# Patient Record
Sex: MALE | Race: WHITE | Employment: OTHER | URBAN - METROPOLITAN AREA
[De-identification: names, ages, dates, MRNs, and addresses within clinical notes are randomized per-mention and may not be internally consistent; named-entity substitution may affect disease eponyms.]

---

## 2017-01-03 ENCOUNTER — TELEPHONE (OUTPATIENT)
Dept: FAMILY MEDICINE | Facility: CLINIC | Age: 42
End: 2017-01-03

## 2017-01-03 NOTE — TELEPHONE ENCOUNTER
Plan does not cover one touch verio test strip as ordered.   Insurance wants qty 200 for 25 days.   Please call to initiate prior authorization.     Phone: [phone]  ID: [id number]

## 2017-01-04 NOTE — TELEPHONE ENCOUNTER
PA approved for One Touch Verio Strip    Approved 1/4/17-1/4/18    Called and informed pharmacy    Carolina Ward MA/MARIELLA

## 2017-01-06 ENCOUNTER — TELEPHONE (OUTPATIENT)
Dept: FAMILY MEDICINE | Facility: CLINIC | Age: 42
End: 2017-01-06

## 2017-01-06 DIAGNOSIS — E10.9 TYPE 1 DIABETES MELLITUS WITHOUT COMPLICATION (H): Primary | ICD-10-CM

## 2017-01-09 NOTE — TELEPHONE ENCOUNTER
Called to check the status of PA-it is still in process. Will received a faxed response.Carolina VALERO      Called and informed patient. He wants to make sure the insurance knows that he has trouble with the pens systems for insulin. He staes his skin is tougher and that the syringe/vial systems works for him. Called and infored insurance.    Also, patient does not want to try Tresiba. He has read up on it and does not want this.Carolina VALERO

## 2017-01-11 NOTE — TELEPHONE ENCOUNTER
PA was denied for Lantus-Formulary alternatives have not been tried    Patient sated the other day he does not want to try Tresiba-is there any other alternatives he can try?    Carolina Ward MA/TC

## 2017-01-11 NOTE — TELEPHONE ENCOUNTER
tresiba is actually better then lantus. Not sure why patient doesn't want to try. Can see our dm ed if desired.

## 2017-01-11 NOTE — TELEPHONE ENCOUNTER
Called and spoke to patient. He said he he has read up on the tresiba and would rather try Basaglar. He said this is more like Lantus. He said he will not be taking any long term insulin until this is figured out.Carolina Ward MA/MARIELLA

## 2017-01-12 RX ORDER — INSULIN GLARGINE 100 [IU]/ML
15 INJECTION, SOLUTION SUBCUTANEOUS 2 TIMES DAILY
Qty: 3 PEN | Refills: 1 | Status: SHIPPED | OUTPATIENT
Start: 2017-01-12 | End: 2017-02-06

## 2017-01-16 ENCOUNTER — TELEPHONE (OUTPATIENT)
Dept: FAMILY MEDICINE | Facility: CLINIC | Age: 42
End: 2017-01-16

## 2017-01-16 DIAGNOSIS — E10.9 TYPE 1 DIABETES MELLITUS WITHOUT COMPLICATION (H): Primary | ICD-10-CM

## 2017-01-16 NOTE — TELEPHONE ENCOUNTER
Called pharmacy since patient has refused to use pen needles in the past week or so.  Pharmacy said patient is the one requesting the pen needles to go with the new Basaglar insulin.    RN sent new Rx in for requested pen needles : 31G x 8mm.    Madison Forman RN

## 2017-01-16 NOTE — TELEPHONE ENCOUNTER
Dinorah states patient needs a box of pen needles to go along with his insulin prescription.    Thank you.

## 2017-01-19 ENCOUNTER — TELEPHONE (OUTPATIENT)
Dept: FAMILY MEDICINE | Facility: CLINIC | Age: 42
End: 2017-01-19

## 2017-01-19 NOTE — TELEPHONE ENCOUNTER
Patient would like a call back re his Basaglar insulin and wants documented in his file that he has tried the Insulin pen for several days not working. Unsure how much insulin actually gets in and what is wasted in the process. States insurance will only pay for this product wants a call back from nurse to discuss.

## 2017-01-19 NOTE — TELEPHONE ENCOUNTER
Pt notified of provider message as written.  Pt verbalized good understanding.  Cheyanne Levin RN

## 2017-01-19 NOTE — TELEPHONE ENCOUNTER
Pt states he just want it documented in his chart that he is having difficulty using the new pen needles since Sun evening and they are too big and bulky.  He states it falls out of his leg and has caused internal lacerations.  Pt is unsure how much of the medicine he is getting.  Pt would like to return to vials and syringe.  He does not like the delivery device for the new medication.  Pt asking if PA can be done for him to return to what he was taking previously.      Advised to see diabetic ed who can help determine what he can have per his insurance and help with PA if needed.  Pt declines.    Pt states he knows Dr. Nino wants him to come in for appointment but is wondering if PA for other med can be done now.  He would rather come in for ov next month.    Cheyanne Levin RN

## 2017-02-06 ENCOUNTER — TELEPHONE (OUTPATIENT)
Dept: FAMILY MEDICINE | Facility: CLINIC | Age: 42
End: 2017-02-06

## 2017-02-06 DIAGNOSIS — E10.9 TYPE 1 DIABETES MELLITUS WITHOUT COMPLICATION (H): Primary | ICD-10-CM

## 2017-02-06 RX ORDER — INSULIN GLARGINE 100 [IU]/ML
INJECTION, SOLUTION SUBCUTANEOUS
Qty: 10 ML | Refills: 0 | Status: SHIPPED | OUTPATIENT
Start: 2017-02-06 | End: 2017-04-24

## 2017-02-06 NOTE — TELEPHONE ENCOUNTER
Patient is calling to check the status of the prior authorization for the insulin glargine (BASAGLAR) 100 UNIT/ML injection  Please call and discuss  Thank you

## 2017-02-06 NOTE — TELEPHONE ENCOUNTER
Pt has been having poor blood sugar control with the basaglar due to having difficulty administrating the insulin, it comes flying out.  Pt would prefer Lantus.  Discussed with diabetic educator who gave me information on [employer] card program to give to pt so he can get Lantus for $10 copay.  Pt notified, information mailed to pt and new prescription sent to pharmacy.  To provider to cosign for change to lantus.  Cheyanne Levin RN

## 2017-02-17 ENCOUNTER — TELEPHONE (OUTPATIENT)
Dept: FAMILY MEDICINE | Facility: CLINIC | Age: 42
End: 2017-02-17

## 2017-02-17 NOTE — TELEPHONE ENCOUNTER
See previous message.  Advised pharmacy no PA needed.  Pt was given information on [employer] program and he will be bringing it to them so he can get the lantus.  Cheyanne Levin RN

## 2017-02-17 NOTE — TELEPHONE ENCOUNTER
Dinorah from [facility] is calling for status on RX: Lantus, wondering if PA will be starting on this. Please call to discuss. Thank you.

## 2017-03-20 DIAGNOSIS — E10.9 TYPE 1 DIABETES MELLITUS WITHOUT COMPLICATION (H): ICD-10-CM

## 2017-03-20 NOTE — LETTER
[facility]                                           [address] [place]  [address]    March 20, 2017    Codey Simon  [facility] [address]    Dear Codey,       We recently received a refill request forinsulin lispro (HUMALOG) 100 UNIT/ML injection .  We have refilled this for  one time  only because you are due for a:    Diabetes office visit and  lab appointment-no further refills without an appointment per Dr Nino      Please schedule this lab appointment 4-5 days prior to the office visit.     Please call at your earliest convenience so that there will not be a delay with your future refills.          Thank you,   Your [facility] Care Team/  [phone]

## 2017-03-20 NOTE — TELEPHONE ENCOUNTER
Please advise patient last refill. Need to be seen or new pmd if not seen for md appointment with previsit fasting labs.

## 2017-04-22 DIAGNOSIS — E10.9 TYPE 1 DIABETES MELLITUS WITHOUT COMPLICATION (H): ICD-10-CM

## 2017-04-24 RX ORDER — INSULIN GLARGINE 100 [IU]/ML
INJECTION, SOLUTION SUBCUTANEOUS
Refills: 1 | OUTPATIENT
Start: 2017-04-24

## 2017-04-24 RX ORDER — INSULIN GLARGINE 100 [IU]/ML
INJECTION, SOLUTION SUBCUTANEOUS
Qty: 10 ML | Refills: 0 | Status: SHIPPED | OUTPATIENT
Start: 2017-04-24 | End: 2017-05-02

## 2017-05-01 ENCOUNTER — TELEPHONE (OUTPATIENT)
Dept: FAMILY MEDICINE | Facility: CLINIC | Age: 42
End: 2017-05-01

## 2017-05-01 DIAGNOSIS — E10.9 TYPE 1 DIABETES MELLITUS WITHOUT COMPLICATION (H): ICD-10-CM

## 2017-05-01 NOTE — TELEPHONE ENCOUNTER
Patient is calling no phone number/or wouldn't give a phone number. Is calling to request medication RX: insulin glargine (LANTUS) 100 UNIT/ML injection, patient was very specific and would like the vile not the pen. Also, RX: insulin lispro (HUMALOG) 100 UNIT/ML injection also in vile, RX: test strips has to be Verio brand for one touch per patient and has to be 300, and RX: for insulin. Thank you.

## 2017-05-02 RX ORDER — INSULIN GLARGINE 100 [IU]/ML
INJECTION, SOLUTION SUBCUTANEOUS
Qty: 10 ML | Refills: 0 | Status: SHIPPED | OUTPATIENT
Start: 2017-05-02 | End: 2017-05-16

## 2017-05-06 DIAGNOSIS — E10.9 TYPE 1 DIABETES MELLITUS WITHOUT COMPLICATION (H): ICD-10-CM

## 2017-05-08 RX ORDER — PEN NEEDLE, DIABETIC 29 G X1/2"
NEEDLE, DISPOSABLE MISCELLANEOUS
Qty: 400 EACH | Refills: 0 | Status: SHIPPED | OUTPATIENT
Start: 2017-05-08 | End: 2017-05-16

## 2017-05-15 ENCOUNTER — TELEPHONE (OUTPATIENT)
Dept: FAMILY MEDICINE | Facility: CLINIC | Age: 42
End: 2017-05-15

## 2017-05-15 ENCOUNTER — OFFICE VISIT (OUTPATIENT)
Dept: FAMILY MEDICINE | Facility: CLINIC | Age: 42
End: 2017-05-15
Payer: COMMERCIAL

## 2017-05-15 VITALS
HEIGHT: 68 IN | HEART RATE: 95 BPM | WEIGHT: 155 LBS | BODY MASS INDEX: 23.49 KG/M2 | TEMPERATURE: 97.6 F | OXYGEN SATURATION: 99 % | SYSTOLIC BLOOD PRESSURE: 127 MMHG | DIASTOLIC BLOOD PRESSURE: 81 MMHG

## 2017-05-15 DIAGNOSIS — E10.9 TYPE 1 DIABETES MELLITUS WITHOUT COMPLICATION (H): Primary | ICD-10-CM

## 2017-05-15 DIAGNOSIS — Z13.6 CARDIOVASCULAR SCREENING; LDL GOAL LESS THAN 100: ICD-10-CM

## 2017-05-15 DIAGNOSIS — M25.512 LEFT SHOULDER PAIN, UNSPECIFIED CHRONICITY: ICD-10-CM

## 2017-05-15 DIAGNOSIS — M72.0 DUPUYTREN'S CONTRACTURE: ICD-10-CM

## 2017-05-15 LAB
ANION GAP SERPL CALCULATED.3IONS-SCNC: 10 MMOL/L (ref 3–14)
BUN SERPL-MCNC: 15 MG/DL (ref 7–30)
CALCIUM SERPL-MCNC: 9.3 MG/DL (ref 8.5–10.1)
CHLORIDE SERPL-SCNC: 105 MMOL/L (ref 94–109)
CHOLEST SERPL-MCNC: 153 MG/DL
CO2 SERPL-SCNC: 25 MMOL/L (ref 20–32)
CREAT SERPL-MCNC: 0.83 MG/DL (ref 0.66–1.25)
CREAT UR-MCNC: 155 MG/DL
GFR SERPL CREATININE-BSD FRML MDRD: ABNORMAL ML/MIN/1.7M2
GLUCOSE SERPL-MCNC: 154 MG/DL (ref 70–99)
HBA1C MFR BLD: 8 % (ref 4.3–6)
HDLC SERPL-MCNC: 48 MG/DL
LDLC SERPL CALC-MCNC: 92 MG/DL
MICROALBUMIN UR-MCNC: 9 MG/L
MICROALBUMIN/CREAT UR: 5.63 MG/G CR (ref 0–17)
NONHDLC SERPL-MCNC: 105 MG/DL
POTASSIUM SERPL-SCNC: 3.7 MMOL/L (ref 3.4–5.3)
SODIUM SERPL-SCNC: 140 MMOL/L (ref 133–144)
T4 FREE SERPL-MCNC: 1.1 NG/DL (ref 0.76–1.46)
TRIGL SERPL-MCNC: 65 MG/DL
TSH SERPL DL<=0.005 MIU/L-ACNC: 0.37 MU/L (ref 0.4–4)

## 2017-05-15 PROCEDURE — 82043 UR ALBUMIN QUANTITATIVE: CPT | Performed by: FAMILY MEDICINE

## 2017-05-15 PROCEDURE — 80061 LIPID PANEL: CPT | Performed by: FAMILY MEDICINE

## 2017-05-15 PROCEDURE — 80048 BASIC METABOLIC PNL TOTAL CA: CPT | Performed by: FAMILY MEDICINE

## 2017-05-15 PROCEDURE — 84443 ASSAY THYROID STIM HORMONE: CPT | Performed by: FAMILY MEDICINE

## 2017-05-15 PROCEDURE — 84439 ASSAY OF FREE THYROXINE: CPT | Performed by: FAMILY MEDICINE

## 2017-05-15 PROCEDURE — 99214 OFFICE O/P EST MOD 30 MIN: CPT | Performed by: FAMILY MEDICINE

## 2017-05-15 PROCEDURE — 36415 COLL VENOUS BLD VENIPUNCTURE: CPT | Performed by: FAMILY MEDICINE

## 2017-05-15 PROCEDURE — 83036 HEMOGLOBIN GLYCOSYLATED A1C: CPT | Performed by: FAMILY MEDICINE

## 2017-05-15 NOTE — NURSING NOTE
"Chief Complaint   Patient presents with     Diabetes     Musculoskeletal Problem     right hand     Shoulder left       Initial /81  Pulse 95  Temp 97.6  F (36.4  C) (Oral)  Ht 5' 8\" (1.727 m)  Wt 155 lb (70.3 kg)  SpO2 99%  BMI 23.57 kg/m2 Estimated body mass index is 23.57 kg/(m^2) as calculated from the following:    Height as of this encounter: 5' 8\" (1.727 m).    Weight as of this encounter: 155 lb (70.3 kg).  Medication Reconciliation: complete   Cheyanne Pitts CMA    "

## 2017-05-15 NOTE — LETTER
[facility]  [address] [facility] [address] [phone]        May 16, 2017    Codey Simon  [address] [facility] [address]            Dear Codey,    Generally normal results except blood sugars too high and tsh (thyroid test) slightly low. Recheck in 6 months. Normal cholesterol and kidney tests.    If you have any questions or concerns, please call myself or my nurse at [phone].    Sincerely,    .Nadeem Nino MD/liseth      Results for orders placed or performed in visit on 05/15/17   Hemoglobin A1c   Result Value Ref Range    Hemoglobin A1C 8.0 (H) 4.3 - 6.0 %   TSH with free T4 reflex   Result Value Ref Range    TSH 0.37 (L) 0.40 - 4.00 mU/L   Lipid panel reflex to direct LDL   Result Value Ref Range    Cholesterol 153 <200 mg/dL    Triglycerides 65 <150 mg/dL    HDL Cholesterol 48 >39 mg/dL    LDL Cholesterol Calculated 92 <100 mg/dL    Non HDL Cholesterol 105 <130 mg/dL   Basic metabolic panel   Result Value Ref Range    Sodium 140 133 - 144 mmol/L    Potassium 3.7 3.4 - 5.3 mmol/L    Chloride 105 94 - 109 mmol/L    Carbon Dioxide 25 20 - 32 mmol/L    Anion Gap 10 3 - 14 mmol/L    Glucose 154 (H) 70 - 99 mg/dL    Urea Nitrogen 15 7 - 30 mg/dL    Creatinine 0.83 0.66 - 1.25 mg/dL    GFR Estimate >90  Non  GFR Calc   >60 mL/min/1.7m2    GFR Estimate If Black >90   GFR Calc   >60 mL/min/1.7m2    Calcium 9.3 8.5 - 10.1 mg/dL   Albumin Random Urine Quantitative   Result Value Ref Range    Creatinine Urine 155 mg/dL    Albumin Urine mg/L 9 mg/L    Albumin Urine mg/g Cr 5.63 0 - 17 mg/g Cr   T4 free   Result Value Ref Range    T4 Free 1.10 0.76 - 1.46 ng/dL

## 2017-05-15 NOTE — MR AVS SNAPSHOT
After Visit Summary   5/15/2017    Codey Simon    MRN: [id number]           Patient Information     Date Of Birth          1975        Visit Information        Provider Department      5/15/2017 1:30 PM Nadeem Nino MD [facility]        Today's Diagnoses     Type 1 diabetes mellitus without complication (H)    -  1    CARDIOVASCULAR SCREENING; LDL GOAL LESS THAN 100        Left shoulder pain, unspecified chronicity        Dupuytren's contracture           Follow-ups after your visit        Additional Services     ORTHOPEDICS ADULT REFERRAL       Your provider has referred you to: FM[facility]: [facility] [phone]   [email or web address]  [facility]: [facility] [phone]   [email or web address]    Please be aware that coverage of these services is subject to the terms and limitations of your health insurance plan.  Call member services at your health plan with any benefit or coverage questions.      Please bring the following to your appointment:    >>   Any x-rays, CTs or MRIs which have been performed.  Contact the facility where they were done to arrange for  prior to your scheduled appointment.    >>   List of current medications   >>   This referral request   >>   Any documents/labs given to you for this referral                  Who to contact     If you have questions or need follow up information about today's clinic visit or your schedule please contact [facility] directly at [phone].  Normal or non-critical lab and imaging results will be communicated to you by MyChart, letter or phone within 4 business days after the clinic has received the results. If you do not hear from us within 7 days, please contact the clinic through MyChart or phone. If you have a critical or abnormal lab result, we will notify you by phone as soon as  "possible.  Submit refill requests through [employer] or call your pharmacy and they will forward the refill request to us. Please allow 3 business days for your refill to be completed.          Additional Information About Your Visit        [employer] Information     [employer] lets you send messages to your doctor, view your test results, renew your prescriptions, schedule appointments and more. To sign up, go to [email or web address] . Click on \"Log in\" on the left side of the screen, which will take you to the Welcome page. Then click on \"Sign up Now\" on the right side of the page.     You will be asked to enter the access code listed below, as well as some personal information. Please follow the directions to create your username and password.     Your access code is: [email or web address]  Expires: 2017  2:06 PM     Your access code will  in 90 days. If you need help or a new code, please call your [place] clinic or [phone].        Care EveryWhere ID     This is your Care EveryWhere ID. This could be used by other organizations to access your [place] medical records  [id number]        Your Vitals Were     Pulse Temperature Height Pulse Oximetry BMI (Body Mass Index)       95 97.6  F (36.4  C) (Oral) 5' 8\" (1.727 m) 99% 23.57 kg/m2        Blood Pressure from Last 3 Encounters:   05/15/17 127/81   16 123/70   02/23/15 134/87    Weight from Last 3 Encounters:   05/15/17 155 lb (70.3 kg)   16 160 lb (72.6 kg)   02/23/15 156 lb (70.8 kg)              We Performed the Following     Albumin Random Urine Quantitative     Basic metabolic panel     Hemoglobin A1c     Lipid panel reflex to direct LDL     ORTHOPEDICS ADULT REFERRAL     TSH with free T4 reflex          Today's Medication Changes          These changes are accurate as of: 5/15/17  2:06 PM.  If you have any questions, ask your nurse or doctor.               These medicines have changed or have updated prescriptions.        Dose/Directions    " "insulin lispro 100 UNIT/ML injection   Commonly known as:  HumaLOG   This may have changed:  additional instructions   Used for:  Type 1 diabetes mellitus without complication (H)   Changed by:  Nadeem Nino MD        Inject  Subcutaneous. 65u daily-sliding scale 3 month supply please.   Quantity:  3 vial   Refills:  0            Where to get your medicines      These medications were sent to [facility] [address] [facility] [place] - [address]  [address] [address]     Phone:  [phone]     insulin lispro 100 UNIT/ML injection                Primary Care Provider Office Phone # Fax #    Nadeem Nino [phone]       [facility] [address] [facility] [address]        Thank you!     Thank you for choosing [facility]  for your care. Our goal is always to provide you with excellent care. Hearing back from our patients is one way we can continue to improve our services. Please take a few minutes to complete the written survey that you may receive in the mail after your visit with us. Thank you!             Your Updated Medication List - Protect others around you: Learn how to safely use, store and throw away your medicines at www.disposemymeds.org.          This list is accurate as of: 5/15/17  2:06 PM.  Always use your most recent med list.                   Brand Name Dispense Instructions for use    alcohol swab prep pads     300 each    Use to swab area of injection/lukas as directed       B-D ULTRA-FINE II SHORT NEEDLE MISC     3 Month    Inject 1 each into the skin 4 times daily (with meals and nightly). 1 box1 box       BD insulin syringe ultrafine 31G X 5/16\" 0.3 ML   Generic drug:  insulin syringe-needle U-100     400 each    USE ONE SYRINGE FOUR TIMES DAILY OR AS DIRECETED       blood glucose calibration solution    NO BRAND SPECIFIED    1 Bottle    Use to calibrate blood glucose monitor as needed as directed.  Dispense One Touch " Verio IQ brand or per insurance.       * blood glucose monitoring test strip    no brand specified    3 Box    by In Vitro route 8 times daily. Per insurance plan - 3 month supply with one year refill       * blood glucose monitoring test strip    no brand specified    300 each    by In Vitro route daily Use to test blood sugar 10 times daily or as directed. Needs to be ONETOUCH ULTRA TEST STRIPS       * blood glucose monitoring test strip    no brand specified    300 strip    Use to test blood sugar 10 times daily or as directed.  Dispense One Touch Verio IQ brand or per insurance.       * BLOOD GLUCOSE TEST STRIPS STRP     300 strip    1 each 4 times daily (with meals and nightly). PER PATIENT HEALTH PLAN, use as directed.       * blood glucose monitoring meter device kit    no brand specified    1 kit    Use to test blood sugar 10 times daily or as directed.       * blood glucose monitoring meter device kit    no brand specified    1 kit    Use to test blood sugar 10 times daily or as directed.  Dispense One Touch Verio IQ brand or per insurance.       ciclopirox 0.77 % cream    LOPROX    90 g    Apply  topically 2 times daily as needed.       * DIABETIC STERILE LANCETS device     3 Month    8-12 x daily       * thin lancets    NO BRAND SPECIFIED    2 Box    Use to test blood sugar 10 times daily or as directed. Dispense One Touch Verio IQ brand or per insurance.       insulin glargine 100 UNIT/ML injection    LANTUS    10 mL    Inject subcutaneous 10 to 15 units twice daily.       insulin lispro 100 UNIT/ML injection    HumaLOG    3 vial    Inject  Subcutaneous. 65u daily-sliding scale 3 month supply please.       insulin pen needle 31G X 8 MM    B-D U/F    200 each    Use to inject twice daily       STATIN NOT PRESCRIBED (INTENTIONAL)     0 each    Statin not prescribed intentionally due to Intolerance (with supporting documentation of trying a statin at least once within the last 5 years)       * Notice:  This  list has 8 medication(s) that are the same as other medications prescribed for you. Read the directions carefully, and ask your doctor or other care provider to review them with you.

## 2017-05-15 NOTE — PROGRESS NOTES
"SUBJECTIVE:  Codey Simon, a 41 year old male scheduled an appointment to discuss the following issues:     Type 1 diabetes mellitus without complication (H)  CARDIOVASCULAR SCREENING; LDL GOAL LESS THAN 100  Follow-up dm1. lantus is covered. No blood sugars <40 or >400.   Not interested in pump. Watching diet. No changes in feet. No numbness/tingling. Overdue eye exam. No retinal. No chest pain or shortness of breath. No nausea, vomiting or diarrhea or black/bloody stools. No urine changes.   Issues with right hand/palm near 4th finger for years - worse. Past 6 months right shoulder pain with elevation.   Medical, social, surgical, and family histories reviewed.    ROS:    OBJECTIVE:  /81  Pulse 95  Temp 97.6  F (36.4  C) (Oral)  Ht 5' 8\" (1.727 m)  Wt 155 lb (70.3 kg)  SpO2 99%  BMI 23.57 kg/m2  EXAM:  GENERAL APPEARANCE: healthy, alert and no distress  EYES: EOMI,  PERRL  NECK: no adenopathy, no asymmetry, masses, or scars and thyroid normal to palpation  RESP: lungs clear to auscultation - no rales, rhonchi or wheezes  CV: regular rates and rhythm, normal S1 S2, no S3 or S4 and no murmur, click or rub -  ABDOMEN:  soft, nontender, no HSM or masses and bowel sounds normal  MS: extremities normal- no gross deformities noted, no evidence of inflammation in joints, FROM in all extremities.  MS: thickening right 4th flexor tendon and pain with elevation left shoulder.   PSYCH: mentation appears normal and affect normal/bright    ASSESSMENT / PLAN:  (E10.9) Type 1 diabetes mellitus without complication (H)  (primary encounter diagnosis)  Comment: worse in normal stable in past but had issues with getting insulin  Plan: Hemoglobin A1c, TSH with free T4 reflex, Basic         metabolic panel, Albumin Random Urine         Quantitative, insulin lispro (HUMALOG) 100         UNIT/ML injection        Continue meds/closely monitor. Consider dm ed if worse ( patient NOT interested). Recheck in 6 " months  Follow-up eye exam    (Z13.6) CARDIOVASCULAR SCREENING; LDL GOAL LESS THAN 100  Comment: ok in past  Plan: Lipid panel reflex to direct LDL        Patient NOT interested in statin or lisinopril.     (M25.512) Left shoulder pain, unspecified chronicity  Comment: likely rotator cuff/strain  Plan: ORTHOPEDICS ADULT REFERRAL        Follow-up ortho. P.t. Exercises/heat    (M72.0) Dupuytren's contracture  Plan: ORTHOPEDICS ADULT REFERRAL        Affecting patient's ability to give insulin. Follow-up ortho.    Nadeem Nino MD

## 2017-05-16 ENCOUNTER — TELEPHONE (OUTPATIENT)
Dept: FAMILY MEDICINE | Facility: CLINIC | Age: 42
End: 2017-05-16

## 2017-05-16 DIAGNOSIS — E10.9 TYPE 1 DIABETES MELLITUS WITHOUT COMPLICATION (H): ICD-10-CM

## 2017-05-16 RX ORDER — INSULIN GLARGINE 100 [IU]/ML
INJECTION, SOLUTION SUBCUTANEOUS
Qty: 10 ML | Refills: 3 | Status: SHIPPED | OUTPATIENT
Start: 2017-05-16 | End: 2018-09-18

## 2017-05-16 RX ORDER — BLOOD SUGAR DIAGNOSTIC
STRIP MISCELLANEOUS
Qty: 400 EACH | Refills: 0 | Status: SHIPPED | OUTPATIENT
Start: 2017-05-16 | End: 2024-02-12

## 2017-05-16 NOTE — TELEPHONE ENCOUNTER
"Patient would like his lab results sent to new address.  Patient stated that he didn't get to talk to provider about refills, but would like to let provider know that patient wants these Rx ready to be refilled but not send them out, insulin lispro (HUMALOG) 100 UNIT/ML injection, insulin glargine (LANTUS) 100 UNIT/ML injection, BD INSULIN SYRINGE ULTRAFINE 31G X 5/16\" 0.3 ML and blood glucose monitoring (NO BRAND SPECIFIED) test strip    Please advise.  Thank you   "

## 2017-05-16 NOTE — LETTER
[facility]  [address] [facility] [place] [address] [phone]        May 17, 2017    Codey Simon  [address] [place] [address]            Dear Codey,    Generally normal results except blood sugars too high and tsh (thyroid test) slightly low. Recheck in 6 months. Normal cholesterol and kidney tests.    If you have any questions or concerns, please call myself or my nurse at [phone].    Sincerely,    .Nadeem Nino MD/liseth      Results for orders placed or performed in visit on 05/15/17   Hemoglobin A1c   Result Value Ref Range    Hemoglobin A1C 8.0 (H) 4.3 - 6.0 %   TSH with free T4 reflex   Result Value Ref Range    TSH 0.37 (L) 0.40 - 4.00 mU/L   Lipid panel reflex to direct LDL   Result Value Ref Range    Cholesterol 153 <200 mg/dL    Triglycerides 65 <150 mg/dL    HDL Cholesterol 48 >39 mg/dL    LDL Cholesterol Calculated 92 <100 mg/dL    Non HDL Cholesterol 105 <130 mg/dL   Basic metabolic panel   Result Value Ref Range    Sodium 140 133 - 144 mmol/L    Potassium 3.7 3.4 - 5.3 mmol/L    Chloride 105 94 - 109 mmol/L    Carbon Dioxide 25 20 - 32 mmol/L    Anion Gap 10 3 - 14 mmol/L    Glucose 154 (H) 70 - 99 mg/dL    Urea Nitrogen 15 7 - 30 mg/dL    Creatinine 0.83 0.66 - 1.25 mg/dL    GFR Estimate >90  Non  GFR Calc   >60 mL/min/1.7m2    GFR Estimate If Black >90   GFR Calc   >60 mL/min/1.7m2    Calcium 9.3 8.5 - 10.1 mg/dL   Albumin Random Urine Quantitative   Result Value Ref Range    Creatinine Urine 155 mg/dL    Albumin Urine mg/L 9 mg/L    Albumin Urine mg/g Cr 5.63 0 - 17 mg/g Cr   T4 free   Result Value Ref Range    T4 Free 1.10 0.76 - 1.46 ng/dL

## 2017-05-30 ENCOUNTER — TELEPHONE (OUTPATIENT)
Dept: FAMILY MEDICINE | Facility: CLINIC | Age: 42
End: 2017-05-30

## 2017-05-30 DIAGNOSIS — E10.9 TYPE 1 DIABETES MELLITUS WITHOUT COMPLICATION (H): Primary | ICD-10-CM

## 2017-05-30 NOTE — TELEPHONE ENCOUNTER
Insurance requires med changes to novolog     Was   Raghavendra marrero [phone]  Cardholder ID [id number]    Razia LUBIN

## 2017-07-04 ENCOUNTER — TELEPHONE (OUTPATIENT)
Dept: NURSING | Facility: CLINIC | Age: 42
End: 2017-07-04

## 2017-07-04 ENCOUNTER — NURSE TRIAGE (OUTPATIENT)
Dept: NURSING | Facility: CLINIC | Age: 42
End: 2017-07-04

## 2017-07-04 NOTE — TELEPHONE ENCOUNTER
"Patient reports he went to  prescription from pharmacy and was told he is no longer prescribed Humalog and is now prescribed Novolog.  Informed patient that per 5/30/17 telephone encounter his provider was informed that his insurance does not cover Humalog.  Patient states, \"That is weird because I never tried to get it filled in May and I did get my Humalog from my pharmacy on 6/6/2017.  Patient states I want to keep taking the Humalog and when I have spoke with my insurance company they assured me that the Humalog and Lantus are both covered.  Informed patient that a message would be sent to his provider for review.  Patient says he does not have a good number to be reached at, so he will call the clinic back tomorrow afternoon.      "

## 2017-07-04 NOTE — TELEPHONE ENCOUNTER
"Clinic Action Needed: Yes, please review and advise, Patient calling clinic on 7/5/17 afternoon for status update.    Patient reports he went to  prescription from pharmacy and was told he is no longer prescribed Humalog and is now prescribed Novolog.  Informed patient that per 5/30/17 telephone encounter his provider was informed that his insurance does not cover Humalog.  Patient states, \"That is weird because I never tried to get it filled in May and I did get my Humalog from my pharmacy on 6/6/2017.   I want to keep taking the Humalog and when I have spoke with my insurance company they assured me that the Humalog and Lantus are both covered.\"  Informed patient that a message would be sent to his provider for review.  Patient says he does not have a good number to be reached at, so he will call the clinic back tomorrow afternoon.    Message routed to provider.      "

## 2017-07-04 NOTE — TELEPHONE ENCOUNTER
Patient insists he's supposed to get humalog insulin. Please clarify with pharmacy. Maybe a prior authorization is needed? Please call patient too.  Roxy Alexander RN-[facility]

## 2017-07-05 NOTE — TELEPHONE ENCOUNTER
See message 5-31-17.  Confirmed with pharmacy that pt insurance declined to cover humalog and wanted pt changed to novolog.  Cheyanne Levin RN

## 2017-07-05 NOTE — TELEPHONE ENCOUNTER
Called [facility]. He needs to have tried and failed novolog before a PA can be considered for humalog.Carolina Ward MA/MARIELLA    Insurance phone # [phone]

## 2017-07-06 DIAGNOSIS — E10.9 TYPE 1 DIABETES MELLITUS WITHOUT COMPLICATION (H): ICD-10-CM

## 2017-07-06 NOTE — TELEPHONE ENCOUNTER
See previous messages. Pharmacy notified humulog not covered per insurance pt needs to use novolog.  Cheyanne Levin RN

## 2017-07-17 ENCOUNTER — TELEPHONE (OUTPATIENT)
Dept: FAMILY MEDICINE | Facility: CLINIC | Age: 42
End: 2017-07-17

## 2017-07-17 DIAGNOSIS — E10.9 TYPE 1 DIABETES MELLITUS WITHOUT COMPLICATION (H): ICD-10-CM

## 2017-07-17 NOTE — TELEPHONE ENCOUNTER
Patient is request a call from 's nurse regarding a prior authorization needed for inuslin- humalog versus novalog. Patient needs to discuss these medications and how these are related to his recent labs from May. Please call to discuss and advise. Thank you.

## 2017-07-17 NOTE — TELEPHONE ENCOUNTER
Called and informed him that PA was started. He said he will call back in a few days to check if we have heard a response.Carolina Ward MA/TC

## 2017-07-17 NOTE — TELEPHONE ENCOUNTER
Called and spoke to patient. I was on the phone for 15 minutes with him. He said that he wants us to try again for the PA. He was taking about molucules, atoms. How the medications are the same but different. I told him we can try PA.     ID # ID [id number]    Insurance phone # [phone]    PA done through [employer] # [id number]    Will await response    Carolina Ward MA/TC

## 2017-07-21 NOTE — TELEPHONE ENCOUNTER
Called and answered questions.     PA approved for humalog    Approved 7/20/17-7/21/18    Carolina Ward MA/MARIELLA

## 2017-07-21 NOTE — TELEPHONE ENCOUNTER
Dione from [employer] called re:  Prior auth for humalog.  We have criteria/clinical questions that need to be answered. Please call at [phone].  Case# [id number].

## 2017-07-24 NOTE — TELEPHONE ENCOUNTER
Called and spoke to patient. I informed him that the PA was approved for the humalog. He would like new RX sent in for the humalog. He will need it in about 10 days. He would also like ti have novolog taken on his medication list.Carolina Ward MA/TC

## 2017-07-24 NOTE — TELEPHONE ENCOUNTER
Patient is calling back about Humalog PA. He only has use of phone for a few hours. Please call this afternoon. He needs this called into the pharmacy. He has questions about this.

## 2017-07-26 NOTE — TELEPHONE ENCOUNTER
Escribe is currently not working. Humalog refill was called into pharmacy.     Mabel Benitez RN   [facility]

## 2017-08-30 DIAGNOSIS — E10.9 TYPE 1 DIABETES MELLITUS WITHOUT COMPLICATION (H): ICD-10-CM

## 2017-08-31 RX ORDER — INSULIN GLARGINE 100 [IU]/ML
INJECTION, SOLUTION SUBCUTANEOUS
Qty: 20 ML | Refills: 0 | Status: SHIPPED | OUTPATIENT
Start: 2017-08-31 | End: 2017-12-05

## 2017-08-31 RX ORDER — BLOOD SUGAR DIAGNOSTIC
STRIP MISCELLANEOUS
Qty: 300 STRIP | Refills: 3 | Status: SHIPPED | OUTPATIENT
Start: 2017-08-31 | End: 2018-01-05

## 2017-10-05 ENCOUNTER — TELEPHONE (OUTPATIENT)
Dept: FAMILY MEDICINE | Facility: CLINIC | Age: 42
End: 2017-10-05

## 2017-10-05 NOTE — TELEPHONE ENCOUNTER
Panel Management Review      Patient has the following on his problem list:     Diabetes    ASA: Not Required     Last A1C  Lab Results   Component Value Date    A1C 8.0 05/15/2017    A1C 6.8 03/18/2016    A1C 7.4 02/23/2015    A1C 6.4 02/14/2014    A1C 6.4 01/31/2013     A1C tested: FAILED    Last LDL:    Lab Results   Component Value Date    CHOL 153 05/15/2017     Lab Results   Component Value Date    HDL 48 05/15/2017     Lab Results   Component Value Date    LDL 92 05/15/2017     Lab Results   Component Value Date    TRIG 65 05/15/2017     Lab Results   Component Value Date    CHOLHDLRATIO 3.6 02/23/2015     Lab Results   Component Value Date    NHDL 105 05/15/2017       Is the patient on a Statin? NO             Is the patient on Aspirin? NO    Medications     HMG CoA Reductase Inhibitors    STATIN NOT PRESCRIBED, INTENTIONAL,          Last three blood pressure readings:  BP Readings from Last 3 Encounters:   05/15/17 127/81   03/18/16 123/70   02/23/15 134/87       Date of last diabetes office visit:      Tobacco History:     History   Smoking Status     Never Smoker   Smokeless Tobacco     Never Used             Composite cancer screening  Chart review shows that this patient is due/due soon for the following None  Summary:    Patient is due/failing the following:   A1C    Action needed:   None, not due till     Type of outreach:    0    Questions for provider review:    None                                                                                                                                    Cheyanne Pitts CMA     Chart routed to none .

## 2017-12-05 DIAGNOSIS — E10.9 TYPE 1 DIABETES MELLITUS WITHOUT COMPLICATION (H): ICD-10-CM

## 2017-12-06 RX ORDER — INSULIN GLARGINE 100 [IU]/ML
INJECTION, SOLUTION SUBCUTANEOUS
Qty: 20 ML | Refills: 2 | Status: SHIPPED | OUTPATIENT
Start: 2017-12-06 | End: 2018-03-12

## 2018-01-05 DIAGNOSIS — E10.9 TYPE 1 DIABETES MELLITUS WITHOUT COMPLICATION (H): ICD-10-CM

## 2018-01-08 RX ORDER — BLOOD SUGAR DIAGNOSTIC
STRIP MISCELLANEOUS
Qty: 100 STRIP | Refills: 0 | Status: SHIPPED | OUTPATIENT
Start: 2018-01-08 | End: 2018-01-09

## 2018-01-09 DIAGNOSIS — E10.9 TYPE 1 DIABETES MELLITUS WITHOUT COMPLICATION (H): ICD-10-CM

## 2018-01-09 NOTE — TELEPHONE ENCOUNTER
Patient is calling to speak with nurse or pcp about medication refill for ONETOUCH VERIO IQ test strip until May 2018 when he will be coming in. Patient stated he will only come in once a year to see provider for check ups and that provider is aware of this.   Please call patient @ [phone] this is a temp number for patient.   Thank you

## 2018-01-09 NOTE — TELEPHONE ENCOUNTER
Per 5/15/17 office visit:  ASSESSMENT / PLAN:  (E10.9) Type 1 diabetes mellitus without complication (H)  (primary encounter diagnosis)  Comment: worse in normal stable in past but had issues with getting insulin  Plan: Hemoglobin A1c, TSH with free T4 reflex, Basic         metabolic panel, Albumin Random Urine         Quantitative, insulin lispro (HUMALOG) 100         UNIT/ML injection        Continue meds/closely monitor. Consider dm ed if worse ( patient NOT interested). Recheck in 6 months  Follow-up eye exam      Patient requesting test strips refill until May when next diabetic check is due per patient seen once a year.   To provider to advise.    .Ruma TOLENTINON, RN, CPN

## 2018-01-10 ENCOUNTER — TELEPHONE (OUTPATIENT)
Dept: FAMILY MEDICINE | Facility: CLINIC | Age: 43
End: 2018-01-10

## 2018-01-10 NOTE — TELEPHONE ENCOUNTER
Patient calling, he went to  his test strips from the pharmacy and they stated he could only get 100 strips instead of 300. He is wondering what the reason for this would be. Also he is wondering why he isn't getting refills for 1 year as discussed with Dr. Nino.

## 2018-01-10 NOTE — TELEPHONE ENCOUNTER
Last ov in May 2017 states follow up with Dr. Nino in 6 months.  Pt was given refill of 100 on 1/8/18 based on RN refill protocol.  Pt called yesterday requesting to wait until May for follow up and  Dr. Nino resent test strips as dispense 300 with one refill.  Pharmacy has this available.  Pt notified.   Cheyanne Levin RN

## 2018-02-12 ENCOUNTER — TELEPHONE (OUTPATIENT)
Dept: FAMILY MEDICINE | Facility: CLINIC | Age: 43
End: 2018-02-12

## 2018-02-12 NOTE — LETTER
[facility] [place]  [address] [facility] [address]  Phone: [phone]      Codey Simon  [address]  [facility] [address]          February 12, 2018          DearSzenobia Simon      Our records indicate that you have not scheduled for a(n)Diabetic check  and Diabetic Educator which was recommended by your health care team. Monitoring and managing your preventative and chronic health conditions are very important to us.       If you have received your health care elsewhere, please provide us with that information so it can be documented in your chart.    Please call [phone] or message us through your [employer] account to schedule an appointment or provide information for your chart.     We look forward to seeing you and working with you on your health care needs.     Sincerely,   Nadeem Nino MD          *If you have already scheduled an appointment, please disregard this reminder

## 2018-02-12 NOTE — TELEPHONE ENCOUNTER
Panel Management Review      Patient has the following on his problem list:     Diabetes    ASA: Failed    Last A1C  Lab Results   Component Value Date    A1C 8.0 05/15/2017    A1C 6.8 03/18/2016    A1C 7.4 02/23/2015    A1C 6.4 02/14/2014    A1C 6.4 01/31/2013     A1C tested: Passed    Last LDL:    Lab Results   Component Value Date    CHOL 153 05/15/2017     Lab Results   Component Value Date    HDL 48 05/15/2017     Lab Results   Component Value Date    LDL 92 05/15/2017     Lab Results   Component Value Date    TRIG 65 05/15/2017     Lab Results   Component Value Date    CHOLHDLRATIO 3.6 02/23/2015     Lab Results   Component Value Date    NHDL 105 05/15/2017       Is the patient on a Statin? NO             Is the patient on Aspirin? NO    Medications     HMG CoA Reductase Inhibitors    STATIN NOT PRESCRIBED, INTENTIONAL,          Last three blood pressure readings:  BP Readings from Last 3 Encounters:   05/15/17 127/81   03/18/16 123/70   02/23/15 134/87       Date of last diabetes office visit: 05/15/2017     Tobacco History:     History   Smoking Status     Never Smoker   Smokeless Tobacco     Never Used           Composite cancer screening  Chart review shows that this patient is due/due soon for the following None  Summary:    Patient is due/failing the following:   A1C    Action needed:   Patient needs office visit for diabetes.    Type of outreach:    Sent letter.    Questions for provider review:    None                                                                                                                                    Sandra Blue M.A.       Chart routed to n/a .

## 2018-02-19 ENCOUNTER — OFFICE VISIT (OUTPATIENT)
Dept: ORTHOPEDICS | Facility: CLINIC | Age: 43
End: 2018-02-19
Payer: COMMERCIAL

## 2018-02-19 VITALS — TEMPERATURE: 97.2 F | RESPIRATION RATE: 18 BRPM | HEIGHT: 68 IN | WEIGHT: 160 LBS | BODY MASS INDEX: 24.25 KG/M2

## 2018-02-19 DIAGNOSIS — M75.02 ADHESIVE CAPSULITIS OF LEFT SHOULDER: Primary | ICD-10-CM

## 2018-02-19 DIAGNOSIS — M72.0 DUPUYTREN'S CONTRACTURE: ICD-10-CM

## 2018-02-19 PROCEDURE — 99203 OFFICE O/P NEW LOW 30 MIN: CPT | Performed by: ORTHOPAEDIC SURGERY

## 2018-02-19 RX ORDER — DICLOFENAC SODIUM 75 MG/1
75 TABLET, DELAYED RELEASE ORAL 2 TIMES DAILY
Qty: 60 TABLET | Refills: 11 | Status: SHIPPED | OUTPATIENT
Start: 2018-02-19 | End: 2019-01-22

## 2018-02-19 NOTE — PATIENT INSTRUCTIONS
Get elastic tubing.  External rotation.  Internal rotation.  Lift to 90 degrees to the side and front with weight. (Scaption)  Shoulder shrug and roll with weights.  Seated row, pull shoulder blades together.  (Horizontal abduction).  Pushup.                                     Rotator Cuff Injury   What is a rotator cuff injury?   A rotator cuff injury is a strain or tear in the group of tendons and muscles that hold your shoulder joint together and help move your shoulder.   How does it occur?   A rotator cuff injury may result from:     using your arm to break a fall     falling onto your arm     lifting a heavy object     use of your shoulder in sports with a repetitive overhead movement, such as swimming, baseball (mainly pitchers), football, and tennis, which gradually strains the tendon     manual labor such as painting, plastering, raking leaves, or housework   What are the symptoms?   The symptoms of a torn rotator cuff are:     arm and shoulder pain     shoulder weakness     shoulder tenderness     loss of shoulder movement, especially overhead   How is it diagnosed?   Your healthcare provider will examine you and check your shoulder for pain, tenderness, and loss of motion as you move your arm in all directions. Your provider will ask if your shoulder pain began suddenly or gradually. You may have an X-ray to make sure there are not any fractures or bone spurs.   Based on these results, you may have other tests or procedures right away or later, such as:     magnetic resonance imaging (MRI), which creates images of your shoulder and surrounding structures with sound waves     an arthrogram, which is an X-ray or MRI that is taken after a special dye has been injected into your shoulder joint to outline its soft structures     arthroscopy, a surgical procedure in which a small instrument is inserted into your shoulder joint so your provider can look directly at your rotator cuff   What is the treatment?   A  tendon in your shoulder can be inflamed, partially torn, or completely torn. What is done about it depends on how torn it is and how much it hurts.   If your tear is a minor one, it can be left to heal by itself if it does not interfere with your everyday activities. Your treatment plan should include:     proper sitting posture, in which your head and shoulders are balanced     rest for your shoulder, which means avoiding strenuous activity or any overhead motion that causes pain     ice packs at least once a day, and preferably 2 or 3 times a day     doing the exercises your healthcare provider gives you     anti-inflammatory drugs. Adults aged 65 years and older should not take non-steroidal anti-inflammatory medicine for more than 7 days without their healthcare provider's approval.     physical therapy to strengthen your shoulder as it heals   If you have a bad tear, you may need to have it repaired by arthroscopy. Arthroscopy can be used to perform surgery on a joint as well as to see inside the joint. The rough edges of a torn tendon can be trimmed and left to heal. Larger tears can be stitched back together. After surgery, your treatment plan will include physical therapy to strengthen your shoulder as it heals.   How long will the effects last?   Full recovery depends on what is torn and how it is treated.   When can I return to my normal activities?   Everyone recovers from an injury at a different rate. Return to your activities will be determined by how soon your shoulder recovers, not by how many days or weeks it has been since your injury has occurred. In general, the longer you have symptoms before you start treatment, the longer it will take to get better. The goal of rehabilitation is to return you to your normal activities as soon as is safely possible. If you return too soon you may worsen your injury.   You may safely return to your normal activities when:     Your injured shoulder has full range  of motion without pain.     Your injured shoulder has regained normal strength compared to the uninjured shoulder.   What can be done to help prevent this from recurring?   The best way to prevent a recurrence is to strengthen your shoulder muscles and keep them in peak condition with shoulder exercises.           Rotator Cuff Strain Rehabilitation Exercises                You may do all of these exercises right away.   Isometric shoulder external rotation:  a doorway with your elbow bent 90 degrees and the back of the wrist on your injured side pressed against the door frame. Try to press your hand outward into the door frame. Hold for 5 seconds. Do 3 sets of 10.   Isometric shoulder internal rotation:  a doorway with your elbow bent 90 degrees and the front of the wrist on your injured side pressed against the door frame. Try to press your palm into the door frame. Hold for 5 seconds. Do 3 sets of 10.   Wand exercise: Flexion: Stand upright and hold a stick in both hands, palms down. Stretch your arms by lifting them over your head, keeping your arms straight. Hold for 5 seconds and return to the starting position. Repeat 10 times.   Wand exercise: Extension: Stand upright and hold a stick in both hands behind your back. Move the stick away from your back. Hold the end position for 5 seconds. Relax and return to the starting position. Repeat 10 times.   Wand exercise: External rotation: Lie on your back and hold a stick in both hands, palms up. Your upper arms should be resting on the floor with your elbows at your sides and bent 90 degrees. Use your uninjured arm to push your injured arm out away from your body. Keep the elbow of your injured arm at your side while it is being pushed. Hold the stretch for 5 seconds. Repeat 10 times.   Wand exercise: Shoulder abduction and adduction: Stand and hold a stick with both hands, palms facing away from your body. Rest the stick against the front of your  thighs. Use your uninjured arm to push your injured arm out to the side and up as high as possible. Keep your arms straight. Hold for 5 seconds. Repeat 10 times.   Resisted shoulder external rotation: Stand sideways next to a door with your injured arm farther from the door. Tie a knot in the end of the tubing and shut the knot in the door at waist level. Hold the other end of the tubing with the hand of your injured arm. Rest the hand of your injured arm across your stomach. Keeping your elbow in at your side, rotate your arm outward and away from your waist. Make sure you keep your elbow bent 90 degrees and your forearm parallel to the floor. Repeat 10 times. Build up to 3 sets of 10.   Resisted shoulder internal rotation: Stand sideways next to a door with your injured arm closest to the door. Tie a knot in the end of the tubing and shut the knot in the door at waist level. Hold the other end of the tubing with the hand of your injured arm. Bend the elbow of your injured arm 90 degrees. Keeping your elbow in at your side, rotate your forearm across your body and then back to the starting position. Make sure you keep your forearm parallel to the floor. Do 3 sets of 10.   Scaption: Stand with your arms at your sides and with your elbows straight. Slowly raise your arms to eye level. As you raise your arms, spread them apart so that they are only slightly in front of your body (at about a 30-degree angle to the front of your body). Point your thumbs toward the ceiling. Hold for 2 seconds and lower your arms slowly. Do 3 sets of 10. Progress to holding a soup can or light weight when you are doing the exercise and increase the weight as the exercise gets easier.   Side-lying external rotation: Lie on your uninjured side with your injured arm at your side and your elbow bent 90 degrees. Keeping your elbow against your side, raise your forearm toward the ceiling and hold for 2 seconds. Slowly lower your arm. Do 3 sets  "of 10. You can start doing this exercise holding a soup can or light weight and gradually increase the weight as long as there is no pain.   Horizontal abduction: Lie on your stomach on a table or the edge of a bed with the arm on your injured side hanging down over the edge. Raise your arm out to the side, with your thumb pointed toward the ceiling, until your arm is parallel to the floor. Hold for 2 seconds and then lower it slowly. Start this exercise with no weight. As you get stronger, add a light weight or hold a soup can. Do 3 sets of 10.   Push-up with a plus: Begin on the floor on your hands and knees. Keep your arms a shoulder width apart and lift your feet off the floor. Arch your back as high as possible and round your shoulders (this is the \"plus\" part or the exercise). Bend your elbows and lower your body to the floor. Return to the starting position and arch your back again. Do 3 sets of 10.   Published by [employer].  This content is reviewed periodically and is subject to change as new health information becomes available. The information is intended to inform and educate and is not a replacement for medical evaluation, advice, diagnosis or treatment by a healthcare professional.   Written by Yelena Tavarez, MS, PT, and Sepideh Albert PT, [facility], [facility], for [employer].   ? 2010 [employer] and/or its affiliates. All Rights Reserved.   Copyright   [employer] 2011  Adult Health Advisor                  "

## 2018-02-19 NOTE — LETTER
"    2/19/2018         RE: Codey Simon  [address]  [facility] [address]        Dear Colleague,    Thank you for referring your patient, Codey Simon, to the [facility]. Please see a copy of my visit note below.    Codey Simon is a 42 year old male who is seen as self referral for left shoulder pain and right hand contracture.    Past Medical History:   Diagnosis Date     diabetes type I     diagnosed at 18 y.o.       History reviewed. No pertinent surgical history.    History reviewed. No pertinent family history.    Social History     Social History     Marital status: Single     Spouse name: N/A     Number of children: N/A     Years of education: N/A     Occupational History     Not on file.     Social History Main Topics     Smoking status: Never Smoker     Smokeless tobacco: Never Used     Alcohol use Yes     Drug use: No     Sexual activity: Yes     Partners: Female     Other Topics Concern     Not on file     Social History Narrative       Current Outpatient Prescriptions   Medication Sig Dispense Refill     diclofenac (VOLTAREN) 75 MG EC tablet Take 1 tablet (75 mg) by mouth 2 times daily 60 tablet 11     blood glucose monitoring (ONETOUCH VERIO IQ) test strip USE TO TEST 10 TIMES DAILY OR AS DIRECTED 300 strip 1     LANTUS VIAL 100 UNIT/ML soln INJECT 10-15 UNITS SUB-Q TWICE DAILY 20 mL 2     HUMALOG 100 UNIT/ML injection INJECT 65 UNITS SUB-Q DAILY ON A SLIDING SCALE AS DIRECTED 60 mL 2     ciclopirox (LOPROX) 0.77 % cream APPLY TOPICALLY 2 TIMES DAILY AS NEEDED. 90 g 2     insulin syringe-needle U-100 (BD INSULIN SYRINGE ULTRAFINE) 31G X 5/16\" 0.3 ML Use 3 syringes daily or as directed. 400 each 0     insulin glargine (LANTUS) 100 UNIT/ML injection Inject subcutaneous 10 to 15 units twice daily. 10 mL 3     insulin pen needle (B-D U/F) 31G X 8 MM Use to inject twice daily 200 each 1     blood glucose monitoring (NO BRAND SPECIFIED) meter device kit Use to test blood sugar 10 " "times daily or as directed.  Dispense One Touch Verio IQ brand or per insurance. 1 kit 0     blood glucose calibration (NO BRAND SPECIFIED) solution Use to calibrate blood glucose monitor as needed as directed.  Dispense One Touch Verio IQ brand or per insurance. 1 Bottle 3     thin (NO BRAND SPECIFIED) lancets Use to test blood sugar 10 times daily or as directed. Dispense One Touch Verio IQ brand or per insurance. 2 Box 3     alcohol swab prep pads Use to swab area of injection/lukas as directed 300 each 3     blood glucose monitoring (NO BRAND SPECIFIED) test strip by In Vitro route daily Use to test blood sugar 10 times daily or as directed. Needs to be ONETOUCH ULTRA TEST STRIPS 300 each 5     STATIN NOT PRESCRIBED, INTENTIONAL, Statin not prescribed intentionally due to Intolerance (with supporting documentation of trying a statin at least once within the last 5 years) 0 each 0       No Known Allergies    REVIEW OF SYSTEMS:  CONSTITUTIONAL:  NEGATIVE for fever, chills, change in weight, not feeling tired  SKIN:  NEGATIVE for worrisome rashes, no skin lumps, no skin ulcers and no non-healing wounds  EYES:  NEGATIVE for vision changes or irritation.  ENT/MOUTH:  NEGATIVE.  No hearing loss, no hoarseness, no difficulty swallowing.  RESP:  NEGATIVE. No cough or shortness of breath.  CV:  NEGATIVE for chest pain, palpitations or peripheral edema  GI:  NEGATIVE for nausea, abdominal pain, heartburn, or change in bowel habits  :  Negative. No dysuria, no hematuria  MUSCULOSKELETAL:  See HPI above  NEURO:  NEGATIVE . No headaches, no dizziness,  no numbness  ENDOCRINE:  NEGATIVE for temperature intolerance, skin/hair changes  HEME/ALLERGY/IMMUNE:  NEGATIVE for bleeding problems  PSYCHIATRIC:  NEGATIVE. no anxiety, no depression.     Exam:  Vitals: Temp 97.2  F (36.2  C)  Resp 18  Ht 1.727 m (5' 8\")  Wt 72.6 kg (160 lb)  BMI 24.33 kg/m2  BMI= Body mass index is 24.33 kg/(m^2).  Constitutional:  healthy, alert and " no distress  Neuro: Alert and Oriented x 3, Gait normal. Sensation grossly WNL.  Psych: Affect normal   Respiratory: Breathing not labored.  Cardiovascular: normal peripheral pulses  Lymph: no adenopathy  Skin: No rashes,worrisome lesions or skin problems      Again, thank you for allowing me to participate in the care of your patient.        Sincerely,        Rom Tiwari MD

## 2018-02-19 NOTE — MR AVS SNAPSHOT
After Visit Summary   2/19/2018    Codey Simon    MRN: [id number]           Patient Information     Date Of Birth          1975        Visit Information        Provider Department      2/19/2018 1:45 PM Rom Tiwari MD [facility]        Today's Diagnoses     Adhesive capsulitis of left shoulder    -  1      Care Instructions    Get elastic tubing.  External rotation.  Internal rotation.  Lift to 90 degrees to the side and front with weight. (Scaption)  Shoulder shrug and roll with weights.  Seated row, pull shoulder blades together.  (Horizontal abduction).  Pushup.                                     Rotator Cuff Injury   What is a rotator cuff injury?   A rotator cuff injury is a strain or tear in the group of tendons and muscles that hold your shoulder joint together and help move your shoulder.   How does it occur?   A rotator cuff injury may result from:     using your arm to break a fall     falling onto your arm     lifting a heavy object     use of your shoulder in sports with a repetitive overhead movement, such as swimming, baseball (mainly pitchers), football, and tennis, which gradually strains the tendon     manual labor such as painting, plastering, raking leaves, or housework   What are the symptoms?   The symptoms of a torn rotator cuff are:     arm and shoulder pain     shoulder weakness     shoulder tenderness     loss of shoulder movement, especially overhead   How is it diagnosed?   Your healthcare provider will examine you and check your shoulder for pain, tenderness, and loss of motion as you move your arm in all directions. Your provider will ask if your shoulder pain began suddenly or gradually. You may have an X-ray to make sure there are not any fractures or bone spurs.   Based on these results, you may have other tests or procedures right away or later, such as:     magnetic resonance imaging (MRI), which creates images of your shoulder and  surrounding structures with sound waves     an arthrogram, which is an X-ray or MRI that is taken after a special dye has been injected into your shoulder joint to outline its soft structures     arthroscopy, a surgical procedure in which a small instrument is inserted into your shoulder joint so your provider can look directly at your rotator cuff   What is the treatment?   A tendon in your shoulder can be inflamed, partially torn, or completely torn. What is done about it depends on how torn it is and how much it hurts.   If your tear is a minor one, it can be left to heal by itself if it does not interfere with your everyday activities. Your treatment plan should include:     proper sitting posture, in which your head and shoulders are balanced     rest for your shoulder, which means avoiding strenuous activity or any overhead motion that causes pain     ice packs at least once a day, and preferably 2 or 3 times a day     doing the exercises your healthcare provider gives you     anti-inflammatory drugs. Adults aged 65 years and older should not take non-steroidal anti-inflammatory medicine for more than 7 days without their healthcare provider's approval.     physical therapy to strengthen your shoulder as it heals   If you have a bad tear, you may need to have it repaired by arthroscopy. Arthroscopy can be used to perform surgery on a joint as well as to see inside the joint. The rough edges of a torn tendon can be trimmed and left to heal. Larger tears can be stitched back together. After surgery, your treatment plan will include physical therapy to strengthen your shoulder as it heals.   How long will the effects last?   Full recovery depends on what is torn and how it is treated.   When can I return to my normal activities?   Everyone recovers from an injury at a different rate. Return to your activities will be determined by how soon your shoulder recovers, not by how many days or weeks it has been since  your injury has occurred. In general, the longer you have symptoms before you start treatment, the longer it will take to get better. The goal of rehabilitation is to return you to your normal activities as soon as is safely possible. If you return too soon you may worsen your injury.   You may safely return to your normal activities when:     Your injured shoulder has full range of motion without pain.     Your injured shoulder has regained normal strength compared to the uninjured shoulder.   What can be done to help prevent this from recurring?   The best way to prevent a recurrence is to strengthen your shoulder muscles and keep them in peak condition with shoulder exercises.           Rotator Cuff Strain Rehabilitation Exercises                You may do all of these exercises right away.   Isometric shoulder external rotation:  a doorway with your elbow bent 90 degrees and the back of the wrist on your injured side pressed against the door frame. Try to press your hand outward into the door frame. Hold for 5 seconds. Do 3 sets of 10.   Isometric shoulder internal rotation:  a doorway with your elbow bent 90 degrees and the front of the wrist on your injured side pressed against the door frame. Try to press your palm into the door frame. Hold for 5 seconds. Do 3 sets of 10.   Wand exercise: Flexion: Stand upright and hold a stick in both hands, palms down. Stretch your arms by lifting them over your head, keeping your arms straight. Hold for 5 seconds and return to the starting position. Repeat 10 times.   Wand exercise: Extension: Stand upright and hold a stick in both hands behind your back. Move the stick away from your back. Hold the end position for 5 seconds. Relax and return to the starting position. Repeat 10 times.   Wand exercise: External rotation: Lie on your back and hold a stick in both hands, palms up. Your upper arms should be resting on the floor with your elbows at your sides  and bent 90 degrees. Use your uninjured arm to push your injured arm out away from your body. Keep the elbow of your injured arm at your side while it is being pushed. Hold the stretch for 5 seconds. Repeat 10 times.   Wand exercise: Shoulder abduction and adduction: Stand and hold a stick with both hands, palms facing away from your body. Rest the stick against the front of your thighs. Use your uninjured arm to push your injured arm out to the side and up as high as possible. Keep your arms straight. Hold for 5 seconds. Repeat 10 times.   Resisted shoulder external rotation: Stand sideways next to a door with your injured arm farther from the door. Tie a knot in the end of the tubing and shut the knot in the door at waist level. Hold the other end of the tubing with the hand of your injured arm. Rest the hand of your injured arm across your stomach. Keeping your elbow in at your side, rotate your arm outward and away from your waist. Make sure you keep your elbow bent 90 degrees and your forearm parallel to the floor. Repeat 10 times. Build up to 3 sets of 10.   Resisted shoulder internal rotation: Stand sideways next to a door with your injured arm closest to the door. Tie a knot in the end of the tubing and shut the knot in the door at waist level. Hold the other end of the tubing with the hand of your injured arm. Bend the elbow of your injured arm 90 degrees. Keeping your elbow in at your side, rotate your forearm across your body and then back to the starting position. Make sure you keep your forearm parallel to the floor. Do 3 sets of 10.   Scaption: Stand with your arms at your sides and with your elbows straight. Slowly raise your arms to eye level. As you raise your arms, spread them apart so that they are only slightly in front of your body (at about a 30-degree angle to the front of your body). Point your thumbs toward the ceiling. Hold for 2 seconds and lower your arms slowly. Do 3 sets of 10. Progress  "to holding a soup can or light weight when you are doing the exercise and increase the weight as the exercise gets easier.   Side-lying external rotation: Lie on your uninjured side with your injured arm at your side and your elbow bent 90 degrees. Keeping your elbow against your side, raise your forearm toward the ceiling and hold for 2 seconds. Slowly lower your arm. Do 3 sets of 10. You can start doing this exercise holding a soup can or light weight and gradually increase the weight as long as there is no pain.   Horizontal abduction: Lie on your stomach on a table or the edge of a bed with the arm on your injured side hanging down over the edge. Raise your arm out to the side, with your thumb pointed toward the ceiling, until your arm is parallel to the floor. Hold for 2 seconds and then lower it slowly. Start this exercise with no weight. As you get stronger, add a light weight or hold a soup can. Do 3 sets of 10.   Push-up with a plus: Begin on the floor on your hands and knees. Keep your arms a shoulder width apart and lift your feet off the floor. Arch your back as high as possible and round your shoulders (this is the \"plus\" part or the exercise). Bend your elbows and lower your body to the floor. Return to the starting position and arch your back again. Do 3 sets of 10.   Published by [employer].  This content is reviewed periodically and is subject to change as new health information becomes available. The information is intended to inform and educate and is not a replacement for medical evaluation, advice, diagnosis or treatment by a healthcare professional.   Written by Yelena Tavarez MS, PT, and Sepideh Albert PT, [facility], [facility], for [employer].   ? 2010 [employer] and/or its affiliates. All Rights Reserved.   Copyright   [employer] 2011  Adult Health Advisor                          Follow-ups after your visit        Your next 10 appointments already scheduled     Mar 20, 2018  2:30 PM " "CDT   Office [facility]adeem [facility] MD   [facility]    [address] [address] [phone]           Bring a current list of meds and any records pertaining to this visit. For Physicals, please bring immunization records and any forms needing to be filled out. Please arrive 10 minutes early to complete paperwork.              Who to contact     If you have questions or need follow up information about today's clinic visit or your schedule please contact [facility] directly at [phone].  Normal or non-critical lab and imaging results will be communicated to you by MyChart, letter or phone within 4 business days after the clinic has received the results. If you do not hear from us within 7 days, please contact the clinic through [employer] or phone. If you have a critical or abnormal lab result, we will notify you by phone as soon as possible.  Submit refill requests through [employer] or call your pharmacy and they will forward the refill request to us. Please allow 3 business days for your refill to be completed.          Additional Information About Your Visit        MyChart Information     [employer] lets you send messages to your doctor, view your test results, renew your prescriptions, schedule appointments and more. To sign up, go to [email or web address] . Click on \"Log in\" on the left side of the screen, which will take you to the Welcome page. Then click on \"Sign up Now\" on the right side of the page.     You will be asked to enter the access code listed below, as well as some personal information. Please follow the directions to create your username and password.     Your access code is: [email or web address]  Expires: 2018  2:53 PM     Your access code will  in 90 days. If you need help or a new code, please call your [facility] or [phone].        Care EveryWhere ID     This is your Care EveryWhere ID. This could " "be used by other organizations to access your [place] medical records  [id number]        Your Vitals Were     Temperature Respirations Height BMI (Body Mass Index)          97.2  F (36.2  C) 18 1.727 m (5' 8\") 24.33 kg/m2         Blood Pressure from Last 3 Encounters:   05/15/17 127/81   03/18/16 123/70   02/23/15 134/87    Weight from Last 3 Encounters:   02/19/18 72.6 kg (160 lb)   05/15/17 70.3 kg (155 lb)   03/18/16 72.6 kg (160 lb)              Today, you had the following     No orders found for display         Today's Medication Changes          These changes are accurate as of 2/19/18  2:53 PM.  If you have any questions, ask your nurse or doctor.               Start taking these medicines.        Dose/Directions    diclofenac 75 MG EC tablet   Commonly known as:  VOLTAREN   Used for:  Adhesive capsulitis of left shoulder   Started by:  Rom Tiwari MD        Dose:  75 mg   Take 1 tablet (75 mg) by mouth 2 times daily   Quantity:  60 tablet   Refills:  11            Where to get your medicines      These medications were sent to [facility] [address] [place] [facility] [address]  [address] [address]     Phone:  [phone]     diclofenac 75 MG EC tablet                Primary Care Provider Office Phone # Fax #    Nadeem Urban Nino [phone] [address] [address] [address]        Equal Access to Services     FANNY YEE AH: Hadii gonzález leyva hadasho Soomaali, waaxda luqadaha, qaybta kaalmada adeegyada, wax richy richards adenate moreira. So [facility] [phone].    ATENCIÓN: Si habla español, tiene a cox disposición servicios gratuitos de asistencia lingüística. Llame al [phone].    We comply with applicable federal civil rights laws and [place] laws. We do not discriminate on the basis of race, color, national origin, age, disability, sex, sexual orientation, or gender identity.            Thank you!     Thank you for choosing [facility] " [facility]  for your care. Our goal is always to provide you with excellent care. Hearing back from our patients is one way we can continue to improve our services. Please take a few minutes to complete the written survey that you may receive in the mail after your visit with us. Thank you!             Your Updated Medication List - Protect others around you: Learn how to safely use, store and throw away your medicines at www.disposemymeds.org.          This list is accurate as of 2/19/18  2:53 PM.  Always use your most recent med list.                   Brand Name Dispense Instructions for use Diagnosis    alcohol swab prep pads     300 each    Use to swab area of injection/lukas as directed    Type 1 diabetes mellitus without complication (H)       blood glucose calibration solution    NO BRAND SPECIFIED    1 Bottle    Use to calibrate blood glucose monitor as needed as directed.  Dispense One Touch Verio IQ brand or per insurance.    Type 1 diabetes mellitus without complication (H)       blood glucose monitoring meter device kit    no brand specified    1 kit    Use to test blood sugar 10 times daily or as directed.  Dispense One Touch Verio IQ brand or per insurance.    Type 1 diabetes mellitus without complication (H)       * blood glucose monitoring test strip    no brand specified    300 each    by In Vitro route daily Use to test blood sugar 10 times daily or as directed. Needs to be ONETOUCH ULTRA TEST STRIPS    Type 1 diabetes mellitus without complication (H)       * blood glucose monitoring test strip    ONETOUCH VERIO IQ    300 strip    USE TO TEST 10 TIMES DAILY OR AS DIRECTED    Type 1 diabetes mellitus without complication (H)       ciclopirox 0.77 % cream    LOPROX    90 g    APPLY TOPICALLY 2 TIMES DAILY AS NEEDED.    Tinea pedis of both feet       diclofenac 75 MG EC tablet    VOLTAREN    60 tablet    Take 1 tablet (75 mg) by mouth 2 times daily    Adhesive capsulitis of left shoulder       humaLOG  "100 UNIT/ML injection   Generic drug:  insulin lispro     60 mL    INJECT 65 UNITS SUB-Q DAILY ON A SLIDING SCALE AS DIRECTED    Type 1 diabetes mellitus without complication (H)       * insulin glargine 100 UNIT/ML injection    LANTUS    10 mL    Inject subcutaneous 10 to 15 units twice daily.    Type 1 diabetes mellitus without complication (H)       * LANTUS VIAL 100 UNIT/ML injection   Generic drug:  insulin glargine     20 mL    INJECT 10-15 UNITS SUB-Q TWICE DAILY    Type 1 diabetes mellitus without complication (H)       insulin pen needle 31G X 8 MM    B-D U/F    200 each    Use to inject twice daily    Type 1 diabetes mellitus without complication (H)       insulin syringe-needle U-100 31G X 5/16\" 0.3 ML    BD insulin syringe ultrafine    400 each    Use 3 syringes daily or as directed.    Type 1 diabetes mellitus without complication (H)       STATIN NOT PRESCRIBED (INTENTIONAL)     0 each    Statin not prescribed intentionally due to Intolerance (with supporting documentation of trying a statin at least once within the last 5 years)    Type 1 diabetes mellitus without complication (H)       thin lancets    NO BRAND SPECIFIED    2 Box    Use to test blood sugar 10 times daily or as directed. Dispense One Touch Verio IQ brand or per insurance.    Type 1 diabetes mellitus without complication (H)       * Notice:  This list has 4 medication(s) that are the same as other medications prescribed for you. Read the directions carefully, and ask your doctor or other care provider to review them with you.      "

## 2018-02-19 NOTE — NURSING NOTE
"Chief Complaint   Patient presents with     Consult     Left shoulder injury on 3/1/17. Right Dupuytren's contracture.       Initial Temp 97.2  F (36.2  C)  Resp 18  Ht 1.727 m (5' 8\")  Wt 72.6 kg (160 lb)  BMI 24.33 kg/m2 Estimated body mass index is 24.33 kg/(m^2) as calculated from the following:    Height as of this encounter: 1.727 m (5' 8\").    Weight as of this encounter: 72.6 kg (160 lb).  Medication Reconciliation: complete   Asha Isabel MA      "
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
## 2018-02-22 NOTE — PROGRESS NOTES
"Codey Simon is a 42 year old male who is seen as self referral for left shoulder pain and right hand contracture.    Past Medical History:   Diagnosis Date     diabetes type I     diagnosed at 18 y.o.       History reviewed. No pertinent surgical history.    History reviewed. No pertinent family history.    Social History     Social History     Marital status: Single     Spouse name: N/A     Number of children: N/A     Years of education: N/A     Occupational History     Not on file.     Social History Main Topics     Smoking status: Never Smoker     Smokeless tobacco: Never Used     Alcohol use Yes     Drug use: No     Sexual activity: Yes     Partners: Female     Other Topics Concern     Not on file     Social History Narrative       Current Outpatient Prescriptions   Medication Sig Dispense Refill     diclofenac (VOLTAREN) 75 MG EC tablet Take 1 tablet (75 mg) by mouth 2 times daily 60 tablet 11     blood glucose monitoring (ONETOUCH VERIO IQ) test strip USE TO TEST 10 TIMES DAILY OR AS DIRECTED 300 strip 1     LANTUS VIAL 100 UNIT/ML soln INJECT 10-15 UNITS SUB-Q TWICE DAILY 20 mL 2     HUMALOG 100 UNIT/ML injection INJECT 65 UNITS SUB-Q DAILY ON A SLIDING SCALE AS DIRECTED 60 mL 2     ciclopirox (LOPROX) 0.77 % cream APPLY TOPICALLY 2 TIMES DAILY AS NEEDED. 90 g 2     insulin syringe-needle U-100 (BD INSULIN SYRINGE ULTRAFINE) 31G X 5/16\" 0.3 ML Use 3 syringes daily or as directed. 400 each 0     insulin glargine (LANTUS) 100 UNIT/ML injection Inject subcutaneous 10 to 15 units twice daily. 10 mL 3     insulin pen needle (B-D U/F) 31G X 8 MM Use to inject twice daily 200 each 1     blood glucose monitoring (NO BRAND SPECIFIED) meter device kit Use to test blood sugar 10 times daily or as directed.  Dispense One Touch Verio IQ brand or per insurance. 1 kit 0     blood glucose calibration (NO BRAND SPECIFIED) solution Use to calibrate blood glucose monitor as needed as directed.  Dispense One Touch Verio IQ " "brand or per insurance. 1 Bottle 3     thin (NO BRAND SPECIFIED) lancets Use to test blood sugar 10 times daily or as directed. Dispense One Touch Verio IQ brand or per insurance. 2 Box 3     alcohol swab prep pads Use to swab area of injection/lukas as directed 300 each 3     blood glucose monitoring (NO BRAND SPECIFIED) test strip by In Vitro route daily Use to test blood sugar 10 times daily or as directed. Needs to be ONETOUCH ULTRA TEST STRIPS 300 each 5     STATIN NOT PRESCRIBED, INTENTIONAL, Statin not prescribed intentionally due to Intolerance (with supporting documentation of trying a statin at least once within the last 5 years) 0 each 0       No Known Allergies    REVIEW OF SYSTEMS:  CONSTITUTIONAL:  NEGATIVE for fever, chills, change in weight, not feeling tired  SKIN:  NEGATIVE for worrisome rashes, no skin lumps, no skin ulcers and no non-healing wounds  EYES:  NEGATIVE for vision changes or irritation.  ENT/MOUTH:  NEGATIVE.  No hearing loss, no hoarseness, no difficulty swallowing.  RESP:  NEGATIVE. No cough or shortness of breath.  CV:  NEGATIVE for chest pain, palpitations or peripheral edema  GI:  NEGATIVE for nausea, abdominal pain, heartburn, or change in bowel habits  :  Negative. No dysuria, no hematuria  MUSCULOSKELETAL:  See HPI above  NEURO:  NEGATIVE . No headaches, no dizziness,  no numbness  ENDOCRINE:  NEGATIVE for temperature intolerance, skin/hair changes  HEME/ALLERGY/IMMUNE:  NEGATIVE for bleeding problems  PSYCHIATRIC:  NEGATIVE. no anxiety, no depression.     Exam:  Vitals: Temp 97.2  F (36.2  C)  Resp 18  Ht 1.727 m (5' 8\")  Wt 72.6 kg (160 lb)  BMI 24.33 kg/m2  BMI= Body mass index is 24.33 kg/(m^2).  Constitutional:  healthy, alert and no distress  Neuro: Alert and Oriented x 3, Gait normal. Sensation grossly WNL.  Psych: Affect normal   Respiratory: Breathing not labored.  Cardiovascular: normal peripheral pulses  Lymph: no adenopathy  Skin: No rashes,worrisome lesions " or skin problems

## 2018-03-08 DIAGNOSIS — E10.9 TYPE 1 DIABETES MELLITUS WITHOUT COMPLICATION (H): ICD-10-CM

## 2018-03-08 RX ORDER — BLOOD SUGAR DIAGNOSTIC
STRIP MISCELLANEOUS
Qty: 300 STRIP | Refills: 2 | Status: SHIPPED | OUTPATIENT
Start: 2018-03-08 | End: 2018-06-07

## 2018-03-12 ENCOUNTER — OFFICE VISIT (OUTPATIENT)
Dept: FAMILY MEDICINE | Facility: CLINIC | Age: 43
End: 2018-03-12
Payer: COMMERCIAL

## 2018-03-12 VITALS
TEMPERATURE: 98.1 F | RESPIRATION RATE: 16 BRPM | HEIGHT: 68 IN | HEART RATE: 75 BPM | OXYGEN SATURATION: 98 % | WEIGHT: 162 LBS | DIASTOLIC BLOOD PRESSURE: 71 MMHG | SYSTOLIC BLOOD PRESSURE: 126 MMHG | BODY MASS INDEX: 24.55 KG/M2

## 2018-03-12 DIAGNOSIS — E10.9 TYPE 1 DIABETES MELLITUS WITHOUT COMPLICATION (H): Primary | ICD-10-CM

## 2018-03-12 LAB — HBA1C MFR BLD: 6.3 % (ref 4.3–6)

## 2018-03-12 PROCEDURE — 36415 COLL VENOUS BLD VENIPUNCTURE: CPT | Performed by: FAMILY MEDICINE

## 2018-03-12 PROCEDURE — 83036 HEMOGLOBIN GLYCOSYLATED A1C: CPT | Performed by: FAMILY MEDICINE

## 2018-03-12 PROCEDURE — 99214 OFFICE O/P EST MOD 30 MIN: CPT | Performed by: FAMILY MEDICINE

## 2018-03-12 RX ORDER — INSULIN GLARGINE 100 [IU]/ML
INJECTION, SOLUTION SUBCUTANEOUS
Qty: 20 ML | Refills: 1 | Status: SHIPPED | OUTPATIENT
Start: 2018-03-12 | End: 2018-08-06

## 2018-03-12 NOTE — LETTER
[facility]  [address] [facility] [address]  Phone: [phone]    March 12, 2018        Codey Simon  [address] [place] [address]          To whom it may concern:    RE: Codey Simon    Patient was seen and treated today at our clinic. Patient has insulin dependent type 1 diabetes and needs to make sure he is well hydrated by drinking lots of water. This might make his urine more diluted.     Please contact me for questions or concerns.      Sincerely,        Nadeem Nino MD

## 2018-03-12 NOTE — MR AVS SNAPSHOT
"              After Visit Summary   3/12/2018    Codey Simon    MRN: [id number]           Patient Information     Date Of Birth          1975        Visit Information        Provider Department      3/12/2018 1:50 PM Nadeem Nino MD [facility]        Today's Diagnoses     Type 1 diabetes mellitus without complication (H)    -  1       Follow-ups after your visit        Who to contact     If you have questions or need follow up information about today's clinic visit or your schedule please contact [facility] directly at [phone].  Normal or non-critical lab and imaging results will be communicated to you by MyChart, letter or phone within 4 business days after the clinic has received the results. If you do not hear from us within 7 days, please contact the clinic through MyChart or phone. If you have a critical or abnormal lab result, we will notify you by phone as soon as possible.  Submit refill requests through [employer] or call your pharmacy and they will forward the refill request to us. Please allow 3 business days for your refill to be completed.          Additional Information About Your Visit        Care EveryWhere ID     This is your Care EveryWhere ID. This could be used by other organizations to access your [facility] medical records  [id number]        Your Vitals Were     Pulse Temperature Respirations Height Pulse Oximetry BMI (Body Mass Index)    75 98.1  F (36.7  C) (Oral) 16 5' 8\" (1.727 m) 98% 24.63 kg/m2       Blood Pressure from Last 3 Encounters:   03/12/18 126/71   05/15/17 127/81   03/18/16 123/70    Weight from Last 3 Encounters:   03/12/18 162 lb (73.5 kg)   02/19/18 160 lb (72.6 kg)   05/15/17 155 lb (70.3 kg)              We Performed the Following     Hemoglobin A1c          Today's Medication Changes          These changes are accurate as of 3/12/18  4:58 PM.  If you have any questions, ask your nurse or doctor.               Start taking " these medicines.        Dose/Directions    ACE/ARB/ARNI NOT PRESCRIBED (INTENTIONAL)   Used for:  Type 1 diabetes mellitus without complication (H)   Started by:  Nadeem Nino MD        Please choose reason not prescribed, below   Refills:  0         These medicines have changed or have updated prescriptions.        Dose/Directions    * insulin glargine 100 UNIT/ML injection   Commonly known as:  LANTUS   This may have changed:  Another medication with the same name was changed. Make sure you understand how and when to take each.   Used for:  Type 1 diabetes mellitus without complication (H)   Changed by:  Nadeem Nino MD        Inject subcutaneous 10 to 15 units twice daily.   Quantity:  10 mL   Refills:  3       * insulin glargine 100 UNIT/ML injection   Commonly known as:  LANTUS VIAL   This may have changed:  See the new instructions.   Used for:  Type 1 diabetes mellitus without complication (H)   Changed by:  Nadeem Nino MD        INJECT 10-15 UNITS SUB-Q TWICE DAILY 3month supply please   Quantity:  20 mL   Refills:  1       insulin lispro 100 UNIT/ML injection   Commonly known as:  humaLOG   This may have changed:  See the new instructions.   Used for:  Type 1 diabetes mellitus without complication (H)   Changed by:  Nadeem Nino MD        INJECT 65 UNITS SUB-Q DAILY ON A SLIDING SCALE AS DIRECTED 3months supply please   Quantity:  60 mL   Refills:  1       * Notice:  This list has 2 medication(s) that are the same as other medications prescribed for you. Read the directions carefully, and ask your doctor or other care provider to review them with you.         Where to get your medicines      These medications were sent to [facility] [id number] [place] [facility] [place]  [address] [place] [address]     Phone:  [phone]     insulin glargine 100 UNIT/ML injection    insulin lispro 100 UNIT/ML injection         Some of these will need a paper prescription and others can  be bought over the counter.  Ask your nurse if you have questions.     You don't need a prescription for these medications     ACE/ARB/ARNI NOT PRESCRIBED (INTENTIONAL)                Primary Care Provider Office Phone # Fax #    Nadeem Nino [phone] [address] [facility] [address]        Equal Access to Services     TERESA YEE : Hadii aad ku hadasho Soomaali, waaxda luqadaha, qaybta kaalmada adeegyada, waxay idiin hayaan adeeg kharash laameya . So [facility] [phone].    ATENCIÓN: Si habla español, tiene a cox disposición servicios gratuitos de asistencia lingüística. Llame al [phone].    We comply with applicable federal civil rights laws and [place] laws. We do not discriminate on the basis of race, color, national origin, age, disability, sex, sexual orientation, or gender identity.            Thank you!     Thank you for choosing [facility]  for your care. Our goal is always to provide you with excellent care. Hearing back from our patients is one way we can continue to improve our services. Please take a few minutes to complete the written survey that you may receive in the mail after your visit with us. Thank you!             Your Updated Medication List - Protect others around you: Learn how to safely use, store and throw away your medicines at www.disposemymeds.org.          This list is accurate as of 3/12/18  4:58 PM.  Always use your most recent med list.                   Brand Name Dispense Instructions for use Diagnosis    ACE/ARB/ARNI NOT PRESCRIBED (INTENTIONAL)      Please choose reason not prescribed, below    Type 1 diabetes mellitus without complication (H)       alcohol swab prep pads     300 each    Use to swab area of injection/lukas as directed    Type 1 diabetes mellitus without complication (H)       blood glucose calibration solution    NO BRAND SPECIFIED    1 Bottle    Use to calibrate blood glucose monitor as needed as directed.   Dispense One Touch Verio IQ brand or per insurance.    Type 1 diabetes mellitus without complication (H)       blood glucose monitoring meter device kit    no brand specified    1 kit    Use to test blood sugar 10 times daily or as directed.  Dispense One Touch Verio IQ brand or per insurance.    Type 1 diabetes mellitus without complication (H)       * blood glucose monitoring test strip    no brand specified    300 each    by In Vitro route daily Use to test blood sugar 10 times daily or as directed. Needs to be ONETOUCH ULTRA TEST STRIPS    Type 1 diabetes mellitus without complication (H)       * blood glucose monitoring test strip    ONETOUCH VERIO IQ    300 strip    USE TO TEST 10 TIMES DAILY OR AS DIRECTED    Type 1 diabetes mellitus without complication (H)       * ONETOUCH VERIO IQ test strip   Generic drug:  blood glucose monitoring     300 strip    USE TO TEST 10 TIMES DAILY OR AS DIRECTED    Type 1 diabetes mellitus without complication (H)       ciclopirox 0.77 % cream    LOPROX    90 g    APPLY TOPICALLY 2 TIMES DAILY AS NEEDED.    Tinea pedis of both feet       diclofenac 75 MG EC tablet    VOLTAREN    60 tablet    Take 1 tablet (75 mg) by mouth 2 times daily    Adhesive capsulitis of left shoulder       * insulin glargine 100 UNIT/ML injection    LANTUS    10 mL    Inject subcutaneous 10 to 15 units twice daily.    Type 1 diabetes mellitus without complication (H)       * insulin glargine 100 UNIT/ML injection    LANTUS VIAL    20 mL    INJECT 10-15 UNITS SUB-Q TWICE DAILY 3month supply please    Type 1 diabetes mellitus without complication (H)       insulin lispro 100 UNIT/ML injection    humaLOG    60 mL    INJECT 65 UNITS SUB-Q DAILY ON A SLIDING SCALE AS DIRECTED 3months supply please    Type 1 diabetes mellitus without complication (H)       insulin pen needle 31G X 8 MM    B-D U/F    200 each    Use to inject twice daily    Type 1 diabetes mellitus without complication (H)       insulin  "syringe-needle U-100 31G X 5/16\" 0.3 ML    BD insulin syringe ultrafine    400 each    Use 3 syringes daily or as directed.    Type 1 diabetes mellitus without complication (H)       STATIN NOT PRESCRIBED (INTENTIONAL)     0 each    Statin not prescribed intentionally due to Intolerance (with supporting documentation of trying a statin at least once within the last 5 years)    Type 1 diabetes mellitus without complication (H)       thin lancets    NO BRAND SPECIFIED    2 Box    Use to test blood sugar 10 times daily or as directed. Dispense One Touch Verio IQ brand or per insurance.    Type 1 diabetes mellitus without complication (H)       * Notice:  This list has 5 medication(s) that are the same as other medications prescribed for you. Read the directions carefully, and ask your doctor or other care provider to review them with you.      "

## 2018-03-12 NOTE — PROGRESS NOTES
"SUBJECTIVE:  Codey Simon, a 42 year old male scheduled an appointment to discuss the following issues:  Type 1 diabetes mellitus without complication (H)  Diclofenac NOT taking daily - prn. Doing p.t. Exercises. Seen orthopedist. No surgery recommended.   No chest pain or shortness of breath. No nausea, vomiting or diarrhea or black or bloody stools.   No urine changes or hematuria. Normal blood pressure and lipids in past.   No Blood sugars <40 or >400.   Eye exam - <5 years.       Past Medical History:   Diagnosis Date     diabetes type I     diagnosed at 18 y.o.       No past surgical history on file.    No family history on file.    Social History   Substance Use Topics     Smoking status: Never Smoker     Smokeless tobacco: Never Used     Alcohol use Yes       ROS:  All other ROS negative.   OBJECTIVE:  /71  Pulse 75  Temp 98.1  F (36.7  C) (Oral)  Resp 16  Ht 5' 8\" (1.727 m)  Wt 162 lb (73.5 kg)  SpO2 98%  BMI 24.63 kg/m2  EXAM:  GENERAL APPEARANCE: healthy, alert and no distress  NECK: no adenopathy, no asymmetry, masses, or scars and thyroid normal to palpation  RESP: lungs clear to auscultation - no rales, rhonchi or wheezes  CV: regular rates and rhythm, normal S1 S2, no S3 or S4 and no murmur, click or rub -  ABDOMEN:  soft, nontender, no HSM or masses and bowel sounds normal  MS: extremities normal- no gross deformities noted, no evidence of inflammation in joints, FROM in all extremities.  NEURO: Normal strength and tone, sensory exam grossly normal, mentation intact and speech normal  PSYCH: mentation appears normal and affect normal/bright    ASSESSMENT / PLAN:  (E10.9) Type 1 diabetes mellitus without complication (H)  (primary encounter diagnosis)  Comment: stable  Plan: ACE/ARB/ARNI NOT PRESCRIBED, INTENTIONAL,,         Hemoglobin A1c, insulin glargine (LANTUS VIAL)         100 UNIT/ML injection, insulin lispro (HUMALOG)        100 UNIT/ML injection        Patient wanted note for " urine and drinking lots of water - see note. Recheck in 6 months  Sooner if worse. Patient not interested on statin or blood pressure med. Advised to limit diclofenac/nsaids and return to clinic if taking regularly for cr.   Follow-up eye exam.   Nadeem Nino

## 2018-03-12 NOTE — NURSING NOTE
"Chief Complaint   Patient presents with     Diabetes       Initial /71  Pulse 75  Temp 98.1  F (36.7  C) (Oral)  Resp 16  Ht 5' 8\" (1.727 m)  Wt 162 lb (73.5 kg)  SpO2 98%  BMI 24.63 kg/m2 Estimated body mass index is 24.63 kg/(m^2) as calculated from the following:    Height as of this encounter: 5' 8\" (1.727 m).    Weight as of this encounter: 162 lb (73.5 kg).    Cheyanne Pitst, CMA    "

## 2018-04-02 ENCOUNTER — TELEPHONE (OUTPATIENT)
Dept: FAMILY MEDICINE | Facility: CLINIC | Age: 43
End: 2018-04-02

## 2018-04-02 NOTE — TELEPHONE ENCOUNTER
Patient wants [facility] contacted with a prescription on his Lantus insulin. This is the only one that works for him. He needs a Prior auth. done.  Patient was told to call on this. He needs a refill of his Lantus by April 7th. There is no way to contact him. Patient states Dr Nino knows about this.     Patient has a form that needs to be signed for the DMV stating its ok to drive. He will be dropping form off at some time to be filled out.

## 2018-04-06 ENCOUNTER — TELEPHONE (OUTPATIENT)
Dept: FAMILY MEDICINE | Facility: CLINIC | Age: 43
End: 2018-04-06

## 2018-04-06 NOTE — TELEPHONE ENCOUNTER
Pharmacy calling, the PA  at the end of March for Lantus vials. He needs a PA started for the Lantus vials. Patient does not have enough manual dexterity for the pen and needs the vials.    Insurance contact information:   [facility] ph: [phone]

## 2018-04-09 NOTE — TELEPHONE ENCOUNTER
Prior Authorization Retail Medication Request    Medication/Dose: insulin glargine (LANTUS VIAL) 100 UNIT/ML injection  ICD code (if different than what is on RX):  Type 1 diabetes mellitus without complication (H) [E10.9]  Previously Tried and Failed:  [place]  Rationale:  Patient does not have enough manual dexterity for the pen and needs the vials.       Insurance Name: [facility]  Insurance ID:  [id number]  Phone # [phone]    Pharmacy Information (if different than what is on RX)  Name:  [facility]  Phone:  [phone]

## 2018-04-11 NOTE — TELEPHONE ENCOUNTER
Central Prior Authorization Team   Phone: [phone]      Called to initiate by phone but this was already approved by EPA.

## 2018-04-19 DIAGNOSIS — E10.9 TYPE 1 DIABETES MELLITUS WITHOUT COMPLICATION (H): ICD-10-CM

## 2018-04-19 RX ORDER — LANCETS 33 GAUGE
EACH MISCELLANEOUS
Qty: 300 EACH | Refills: 2 | Status: SHIPPED | OUTPATIENT
Start: 2018-04-19

## 2018-06-07 DIAGNOSIS — E10.9 TYPE 1 DIABETES MELLITUS WITHOUT COMPLICATION (H): ICD-10-CM

## 2018-06-08 RX ORDER — BLOOD SUGAR DIAGNOSTIC
STRIP MISCELLANEOUS
Qty: 300 STRIP | Refills: 2 | Status: SHIPPED | OUTPATIENT
Start: 2018-06-08 | End: 2018-09-06

## 2018-06-08 NOTE — TELEPHONE ENCOUNTER
"Prescription approved per [facility] Refill Protocol.    Per last office visit plan, pt due for office visit in August.    Requested Prescriptions   Pending Prescriptions Disp Refills     ONETOUCH VERIO IQ test strip [Pharmacy Med Name: ONE TOUCH VERIO TEST STRIP] 300 strip 2     Sig: USE TO TEST 10 TIMES DAILY OR AS DIRECTED    Diabetic Supplies Protocol Passed    6/7/2018 11:33 AM       Passed - Patient is 18 years of age or older       Passed - Recent (6 mo) or future (30 days) visit within the authorizing provider's specialty    Patient had office visit in the last 6 months or has a visit in the next 30 days with authorizing provider.  See \"Patient Info\" tab in inbasket, or \"Choose Columns\" in Meds & Orders section of the refill encounter.            Mariam Molina RN    "

## 2018-07-16 ENCOUNTER — TELEPHONE (OUTPATIENT)
Dept: FAMILY MEDICINE | Facility: CLINIC | Age: 43
End: 2018-07-16

## 2018-07-16 NOTE — TELEPHONE ENCOUNTER
Prior Authorization Retail Medication Request    Medication/Dose: insulin lispro (HUMALOG) 100 UNIT/ML injection  ICD code (if different than what is on RX):  Type 1 diabetes mellitus without complication (H) [E10.9]   Previously Tried and Failed:    Rationale:      Covermymeds Key: [id number]

## 2018-07-16 NOTE — TELEPHONE ENCOUNTER
Prior Authorization Not Needed per Insurance    Medication: insulin lispro (HUMALOG) 100 UNIT/ML injection  Insurance Company: [employer] - Phone [phone] Fax [phone]  Expected CoPay:      Pharmacy Filling the Rx: [facility] [id number], [place] - [address] [facility]  Pharmacy Notified: Yes  Patient Notified: Yes

## 2018-07-16 NOTE — TELEPHONE ENCOUNTER
Central Prior Authorization Team   Phone: [phone]      PA Initiation    Medication: insulin lispro (HUMALOG) 100 UNIT/ML injection  Insurance Company: [employer] - Phone [phone] Fax [phone]  Pharmacy Filling the Rx: [facility] [id number] [place] - [address]  Filling Pharmacy Phone: [phone]  Filling Pharmacy Fax:    Start Date: 7/16/2018

## 2018-08-06 DIAGNOSIS — E10.9 TYPE 1 DIABETES MELLITUS WITHOUT COMPLICATION (H): ICD-10-CM

## 2018-08-08 ENCOUNTER — TELEPHONE (OUTPATIENT)
Dept: FAMILY MEDICINE | Facility: CLINIC | Age: 43
End: 2018-08-08

## 2018-08-08 RX ORDER — INSULIN GLARGINE 100 [IU]/ML
INJECTION, SOLUTION SUBCUTANEOUS
Qty: 20 ML | Refills: 0 | Status: SHIPPED | OUTPATIENT
Start: 2018-08-08 | End: 2020-02-17

## 2018-08-08 NOTE — TELEPHONE ENCOUNTER
Three month refill sent to pharmacy.  See other message about refill.  Pt requesting refills until March.  No available phone number to reach pt at.  To provider to advise on more refills.  Cheyanne TOLENTINON, RN

## 2018-08-08 NOTE — TELEPHONE ENCOUNTER
Pharmacy is calling looking for the renew of Rx insulin glargine (LANTUS VIAL) 100 UNIT/ML injection   Please call to discuss  Thank you

## 2018-09-06 DIAGNOSIS — E10.9 TYPE 1 DIABETES MELLITUS WITHOUT COMPLICATION (H): ICD-10-CM

## 2018-09-06 NOTE — LETTER
September 7, 2018    Codey Simon  [address] [place] [address]    Dear Codey,       We recently received a refill request for ONETOUCH VERIO IQ test strip.  We have refilled this for one time only because you are due for a:    Diabetes office visit and fasting lab appointment      Please schedule this lab appointment 4-5 days prior to the office visit.     Please call at your earliest convenience so that there will not be a delay with your future refills.          Thank you,   Your [facility] Team/  [phone]

## 2018-09-07 RX ORDER — BLOOD SUGAR DIAGNOSTIC
STRIP MISCELLANEOUS
Qty: 300 STRIP | Refills: 0 | Status: SHIPPED | OUTPATIENT
Start: 2018-09-07

## 2018-09-07 NOTE — TELEPHONE ENCOUNTER
Medication is being filled for 1 time refill only due to:  Patient needs to be seen because DUE FOR DIABETIC RECHECK THIS MONTH. PLEASE CALL TO SCHEDULE...  Steffi Myers RN

## 2018-09-17 ENCOUNTER — TELEPHONE (OUTPATIENT)
Dept: FAMILY MEDICINE | Facility: CLINIC | Age: 43
End: 2018-09-17

## 2018-09-17 DIAGNOSIS — E10.9 TYPE 1 DIABETES MELLITUS WITHOUT COMPLICATION (H): ICD-10-CM

## 2018-09-17 NOTE — TELEPHONE ENCOUNTER
Last ov, 3/12/18 states follow up in three months.  See past messages. Pt only wants to be seen once per year.  He is unreachable by phone.  To provider to advise.  Cheyanne TOLENTINON, RN

## 2018-09-17 NOTE — TELEPHONE ENCOUNTER
Refill requests for humalog, lantus and test strips.  Codey vijay he should have refills until March 2019.  What is the issue?  He wants to know why he has to constantly call for these?  Pharmacy told him to call clinic for these refills.

## 2018-09-18 RX ORDER — INSULIN GLARGINE 100 [IU]/ML
INJECTION, SOLUTION SUBCUTANEOUS
Qty: 10 ML | Refills: 2 | Status: SHIPPED | OUTPATIENT
Start: 2018-09-18 | End: 2019-01-03

## 2018-09-18 NOTE — TELEPHONE ENCOUNTER
Left message for patient refills x 3 months, needs follow up appointment for diabetes at that time    Ruma TOLENTINON, RN, CPN

## 2018-09-20 DIAGNOSIS — E10.9 TYPE 1 DIABETES MELLITUS WITHOUT COMPLICATION (H): ICD-10-CM

## 2018-09-20 RX ORDER — PEN NEEDLE, DIABETIC 29 G X1/2"
NEEDLE, DISPOSABLE MISCELLANEOUS
Qty: 400 EACH | Refills: 0 | Status: SHIPPED | OUTPATIENT
Start: 2018-09-20 | End: 2022-02-28

## 2018-09-24 DIAGNOSIS — E10.9 TYPE 1 DIABETES MELLITUS WITHOUT COMPLICATION (H): ICD-10-CM

## 2018-09-26 ENCOUNTER — TELEPHONE (OUTPATIENT)
Dept: FAMILY MEDICINE | Facility: CLINIC | Age: 43
End: 2018-09-26

## 2018-09-26 NOTE — TELEPHONE ENCOUNTER
Patient would like a new rx for     One touch verio meter with supplies.    Send to     [address] [address]            Cheyanne Pitts [facility]

## 2018-09-26 NOTE — TELEPHONE ENCOUNTER
Per pharmacy, this was sent in error.  Test strips and meter prescriptions were received previously.  Courtney Cotter RN

## 2018-11-05 NOTE — PROGRESS NOTES
"Codey Simon is a 42 year old year old male who is seen for  a left side wrist injury.    Mechanism: The patient reports he fell down 5-6 cement stairs 11/2/18 and is unsure if he lost consciousness. He developed pain in his left wrist and posterior neck pain following this injury.     He reports having significant pain/discomfort around the wrist.   Other symptoms: pain in the neck helped by norco +Flexeril, but has a burning pain around the upper chest on both sides.    Previous treatment: splinting at [facility] for a \" non-displaced distal radius fracture\"    Past medical history: See patient history on EMR   Past Medical History:   Diagnosis Date     diabetes type I     diagnosed at 18 y.o.      No past surgical history on file.     Physical Exam  The splint was removed  There is moderate swelling of the wrist   There is  tenderness over the distal radius.  There is no maceration of the skin.  There is no deformity in the area.  Range of motion: not tested and most movements are painful.  CMS intact in bilateral upper extremities.  No focal deficits  Some limitations of cervical range of motion, and now exacerbation of the upper chest symptoms, and no radicular symptoms.      X-rays:  Demonstrate a distal radius fracture with neutral angulation, no displacement. Some ulnar fossa comminution. No apparent dorsal comminution  Distal ulna fracture?:  no        CT scan of the neck was negative for fracture, and no mention of the discs was made.    Assessment:  Distal radius fracture   Mild/no displacement.  Not enough to warrant reduction at this time.  Cervical sprain, unsure of the cause of the the upper chest burning.    Plan:  Immobilization: a well molded short arm cast applied  Neurosurgery consultation  Soft collar issued.  Don't wear full time.  Worsening of the upper chest burning should prompt a trip to the ER again  Oxycodone prescription and Flexeril refill prescription written.    Activity restrictions: "  No lifting, pushing or contact activities   Was instructed to keep the cast clean and dry.     Pain Control:   appropriate doses of OTC Tylenol/NSAIDS discussed, to be used as needed, discussed elevation of the affected extemity above the level of the heart as much as possible to help reduce swelling and apply ice to the affected area as discussed  Oxycodone prescription written  Cervical collar    Return to clinic in 2 week(s).  Xrays in the cast at that time.      ZULEIMA Wells MD  Dept. Orthopedic Surgery  [facility]

## 2018-11-06 ENCOUNTER — OFFICE VISIT (OUTPATIENT)
Dept: ORTHOPEDICS | Facility: CLINIC | Age: 43
End: 2018-11-06
Payer: COMMERCIAL

## 2018-11-06 VITALS
SYSTOLIC BLOOD PRESSURE: 151 MMHG | HEART RATE: 107 BPM | WEIGHT: 162 LBS | DIASTOLIC BLOOD PRESSURE: 93 MMHG | BODY MASS INDEX: 24.55 KG/M2 | HEIGHT: 68 IN | OXYGEN SATURATION: 96 %

## 2018-11-06 DIAGNOSIS — S52.502A CLOSED FRACTURE OF DISTAL END OF LEFT RADIUS, UNSPECIFIED FRACTURE MORPHOLOGY, INITIAL ENCOUNTER: Primary | ICD-10-CM

## 2018-11-06 DIAGNOSIS — S13.9XXA CERVICAL SPRAIN, INITIAL ENCOUNTER: ICD-10-CM

## 2018-11-06 PROCEDURE — 25600 CLTX DST RDL FX/EPHYS SEP WO: CPT | Performed by: ORTHOPAEDIC SURGERY

## 2018-11-06 PROCEDURE — 99203 OFFICE O/P NEW LOW 30 MIN: CPT | Mod: 57 | Performed by: ORTHOPAEDIC SURGERY

## 2018-11-06 RX ORDER — HYDROCODONE BITARTRATE AND ACETAMINOPHEN 5; 325 MG/1; MG/1
1 TABLET ORAL
COMMUNITY
Start: 2018-11-03 | End: 2018-11-27

## 2018-11-06 RX ORDER — OXYCODONE HYDROCHLORIDE 5 MG/1
5 TABLET ORAL EVERY 6 HOURS PRN
Qty: 30 TABLET | Refills: 0 | Status: SHIPPED | OUTPATIENT
Start: 2018-11-06 | End: 2019-03-12

## 2018-11-06 RX ORDER — HYDROCODONE BITARTRATE AND ACETAMINOPHEN 5; 325 MG/1; MG/1
TABLET ORAL
Refills: 0 | COMMUNITY
Start: 2018-11-03 | End: 2019-03-12

## 2018-11-06 RX ORDER — IBUPROFEN 800 MG/1
TABLET, FILM COATED ORAL
Refills: 0 | COMMUNITY
Start: 2018-11-03 | End: 2020-04-21

## 2018-11-06 RX ORDER — CYCLOBENZAPRINE HCL 5 MG
10 TABLET ORAL 3 TIMES DAILY PRN
Qty: 40 TABLET | Refills: 0 | Status: SHIPPED | OUTPATIENT
Start: 2018-11-06 | End: 2019-03-12

## 2018-11-06 RX ORDER — CYCLOBENZAPRINE HCL 10 MG
TABLET ORAL
Refills: 0 | COMMUNITY
Start: 2018-11-03 | End: 2019-03-12

## 2018-11-06 NOTE — MR AVS SNAPSHOT
After Visit Summary   11/6/2018    Codey Simon    MRN: [id number]           Patient Information     Date Of Birth          1975        Visit Information        Provider Department      11/6/2018 3:30 PM Noah Wells MD [facility]        Today's Diagnoses     Closed fracture of distal end of left radius, unspecified fracture morphology, initial encounter    -  1    Cervical sprain, initial encounter          Care Instructions    Patient to follow up with Primary Care provider regarding elevated blood pressure.            Follow-ups after your visit        Additional Services     [facility] PT, HAND, AND CHIROPRACTIC REFERRAL       Physical Therapy, Hand Therapy and Chiropractic Care are available through:  *[facility]  *Hand Therapy (Occupational Therapy or Physical Therapy)  *[facility]    Call one number to schedule at any of the above locations: [phone].    Physical therapy, Hand therapy and/or Chiropractic care has been recommended by your physician as an excellent treatment option to reduce pain and help people return to normal activities, including sports.  Therapy and/or chiropractic care services are a great complement or alternative to expensive and invasive surgery, injections, or long-term use of prescription medications. The primary goal is to identify the underlying problem and provide you the tools to manage your condition on your own.     Please be aware that coverage of these services is subject to the terms and limitations of your health insurance plan.  Call member services at your health plan with any benefit or coverage questions.      Please bring the following to your appointment:  *Your personal calendar for scheduling future appointments  *Comfortable clothing            NEUROSURGERY REFERRAL       Your provider has referred you to: [facility]: [facility]: Steffi Garcia CNP [phone]  [phone]   [email or web address]    Please be aware that coverage of these services is subject to the terms and limitations of your health insurance plan.  Call member services at your health plan with any benefit or coverage questions.      Please bring the following with you to your appointment:    (1) Any X-Rays, CTs or MRIs which have been performed.  Contact the facility where they were done to arrange for  prior to your scheduled appointment.   (2) List of current medications  (3) This referral request   (4) Any documents/labs given to you for this referral                  Future tests that were ordered for you today     Open Future Orders        Priority Expected Expires Ordered    [facility] PT, HAND, AND CHIROPRACTIC REFERRAL Routine  11/6/2019 11/6/2018            Who to contact     If you have questions or need follow up information about today's clinic visit or your schedule please contact [facility] directly at [phone].  Normal or non-critical lab and imaging results will be communicated to you by MyChart, letter or phone within 4 business days after the clinic has received the results. If you do not hear from us within 7 days, please contact the clinic through MyChart or phone. If you have a critical or abnormal lab result, we will notify you by phone as soon as possible.  Submit refill requests through [employer] or call your pharmacy and they will forward the refill request to us. Please allow 3 business days for your refill to be completed.          Additional Information About Your Visit        Care EveryWhere ID     This is your Care EveryWhere ID. This could be used by other organizations to access your [place] medical records  [id number]        Your Vitals Were     Pulse Height Pulse Oximetry BMI (Body Mass Index)          107 1.727 m (5' 8\") 96% 24.63 kg/m2         Blood Pressure from Last 3 Encounters:   11/06/18 (!) 151/93   03/12/18 126/71 "   05/15/17 127/81    Weight from Last 3 Encounters:   11/06/18 73.5 kg (162 lb)   03/12/18 73.5 kg (162 lb)   02/19/18 72.6 kg (160 lb)              We Performed the Following     CAST SUPPLIES SHORT ARM ADULT     CLOSED TX DIST RAD/ULNAR STYL FX W/O MANIP     CLOSED TX DIST RAD/ULNAR STYL FX W/O MANIP     NEUROSURGERY REFERRAL          Today's Medication Changes          These changes are accurate as of 11/6/18  5:44 PM.  If you have any questions, ask your nurse or doctor.               Start taking these medicines.        Dose/Directions    oxyCODONE IR 5 MG tablet   Commonly known as:  ROXICODONE   Used for:  Closed fracture of distal end of left radius, unspecified fracture morphology, initial encounter   Started by:  Noah Wells MD        Dose:  5 mg   Take 1 tablet (5 mg) by mouth every 6 hours as needed for severe pain   Quantity:  30 tablet   Refills:  0         These medicines have changed or have updated prescriptions.        Dose/Directions    * cyclobenzaprine 10 MG tablet   Commonly known as:  FLEXERIL   This may have changed:  Another medication with the same name was added. Make sure you understand how and when to take each.   Changed by:  Noah Wells MD        TAKE 1 TABLET BY MOUTH 3 TIMES DAILY AS NEEDED FOR MUSCLE SPASM   Refills:  0       * cyclobenzaprine 5 MG tablet   Commonly known as:  FLEXERIL   This may have changed:  You were already taking a medication with the same name, and this prescription was added. Make sure you understand how and when to take each.   Used for:  Cervical sprain, initial encounter   Changed by:  Noah Wells MD        Dose:  10 mg   Take 2 tablets (10 mg) by mouth 3 times daily as needed for muscle spasms   Quantity:  40 tablet   Refills:  0       * Notice:  This list has 2 medication(s) that are the same as other medications prescribed for you. Read the directions carefully, and ask your doctor or other care provider to review them with  you.         Where to get your medicines      These medications were sent to [id number] [place] - [address]  [address] [place] [address]     Phone:  [phone]     cyclobenzaprine 5 MG tablet         Some of these will need a paper prescription and others can be bought over the counter.  Ask your nurse if you have questions.     Bring a paper prescription for each of these medications     oxyCODONE IR 5 MG tablet               Information about OPIOIDS     PRESCRIPTION OPIOIDS: WHAT YOU NEED TO KNOW   We gave you an opioid (narcotic) pain medicine. It is important to manage your pain, but opioids are not always the best choice. You should first try all the other options your care team gave you. Take this medicine for as short a time (and as few doses) as possible.    Some activities can increase your pain, such as bandage changes or therapy sessions. It may help to take your pain medicine 30 to 60 minutes before these activities. Reduce your stress by getting enough sleep, working on hobbies you enjoy and practicing relaxation or meditation. Talk to your care team about ways to manage your pain beyond prescription opioids.    These medicines have risks:    DO NOT drive when on new or higher doses of pain medicine. These medicines can affect your alertness and reaction times, and you could be arrested for driving under the influence (DUI). If you need to use opioids long-term, talk to your care team about driving.    DO NOT operate heavy machinery    DO NOT do any other dangerous activities while taking these medicines.    DO NOT drink any alcohol while taking these medicines.     If the opioid prescribed includes acetaminophen, DO NOT take with any other medicines that contain acetaminophen. Read all labels carefully. Look for the word  acetaminophen  or  Tylenol.  Ask your pharmacist if you have questions or are unsure.    You can get addicted to pain medicines, especially if you have  a history of addiction (chemical, alcohol or substance dependence). Talk to your care team about ways to reduce this risk.    All opioids tend to cause constipation. Drink plenty of water and eat foods that have a lot of fiber, such as fruits, vegetables, prune juice, apple juice and high-fiber cereal. Take a laxative (Miralax, milk of magnesia, Colace, Senna) if you don t move your bowels at least every other day. Other side effects include upset stomach, sleepiness, dizziness, throwing up, tolerance (needing more of the medicine to have the same effect), physical dependence and slowed breathing.    Store your pills in a secure place, locked if possible. We will not replace any lost or stolen medicine. If you don t finish your medicine, please throw away (dispose) as directed by your pharmacist. The [place] Pollution [employer] Agency has more information about safe disposal: [email or web address]         Primary Care Provider Office Phone # Fax #    Nadeem Nino [phone] [address] [facility] [address]        Equal Access to Services     TERESA YEE : Hadii aad ku hadasho Socorinne, waaxda luqadaha, qaybta kaalmada dayami, vinicio richards . So [facility] [phone].    ATENCIÓN: Si habla español, tiene a cox disposición servicios gratuitos de asistencia lingüística. Marcelo al [phone].    We comply with applicable federal civil rights laws and [place] laws. We do not discriminate on the basis of race, color, national origin, age, disability, sex, sexual orientation, or gender identity.            Thank you!     Thank you for choosing [facility]  for your care. Our goal is always to provide you with excellent care. Hearing back from our patients is one way we can continue to improve our services. Please take a few minutes to complete the written survey that you may receive in the mail  after your visit with us. Thank you!             Your Updated Medication List - Protect others around you: Learn how to safely use, store and throw away your medicines at www.disposemymeds.org.          This list is accurate as of 11/6/18  5:44 PM.  Always use your most recent med list.                   Brand Name Dispense Instructions for use Diagnosis    ACE/ARB/ARNI NOT PRESCRIBED (INTENTIONAL)      Please choose reason not prescribed, below    Type 1 diabetes mellitus without complication (H)       alcohol swab prep pads     300 each    Use to swab area of injection/lukas as directed    Type 1 diabetes mellitus without complication (H)       blood glucose calibration solution    NO BRAND SPECIFIED    1 Bottle    Use to calibrate blood glucose monitor as needed as directed.  Dispense One Touch Verio IQ brand or per insurance.    Type 1 diabetes mellitus without complication (H)       blood glucose monitoring meter device kit    no brand specified    1 kit    Use to test blood sugar 10 times daily or as directed.  Dispense One Touch Verio IQ brand or per insurance.    Type 1 diabetes mellitus without complication (H)       ciclopirox 0.77 % cream    LOPROX    90 g    APPLY TOPICALLY 2 TIMES DAILY AS NEEDED.    Tinea pedis of both feet       * cyclobenzaprine 10 MG tablet    FLEXERIL     TAKE 1 TABLET BY MOUTH 3 TIMES DAILY AS NEEDED FOR MUSCLE SPASM        * cyclobenzaprine 5 MG tablet    FLEXERIL    40 tablet    Take 2 tablets (10 mg) by mouth 3 times daily as needed for muscle spasms    Cervical sprain, initial encounter       diclofenac 75 MG EC tablet    VOLTAREN    60 tablet    Take 1 tablet (75 mg) by mouth 2 times daily    Adhesive capsulitis of left shoulder       * HYDROcodone-acetaminophen 5-325 MG per tablet    NORCO     Take 1 tablet by mouth        * HYDROcodone-acetaminophen 5-325 MG per tablet    NORCO     TAKE 1 TABLET BY MOUTH EVERY 6 HOURS AS NEEDED FOR PAIN        ibuprofen 800 MG tablet     "ADVIL/MOTRIN     TAKE 1 TABLET BY MOUTH EVERY 8 HOURS AS NEEDED        insulin lispro 100 UNIT/ML injection    humaLOG    60 mL    INJECT 65 UNITS SUB-Q DAILY ON A SLIDING SCALE AS DIRECTED 3months supply please    Type 1 diabetes mellitus without complication (H)       insulin pen needle 31G X 8 MM    B-D U/F    200 each    Use to inject twice daily    Type 1 diabetes mellitus without complication (H)       * insulin syringe-needle U-100 31G X 5/16\" 0.3 ML    BD insulin syringe ultrafine    400 each    Use 3 syringes daily or as directed.    Type 1 diabetes mellitus without complication (H)       * BD insulin syringe ultrafine 31G X 5/16\" 0.3 ML   Generic drug:  insulin syringe-needle U-100     400 each    USE ONE SYRINGE FOUR TIMES DAILY OR AS DIRECETED    Type 1 diabetes mellitus without complication (H)       * LANTUS VIAL 100 UNIT/ML injection   Generic drug:  insulin glargine     20 mL    INJECT 10-15 UNITS SUB-Q TWICE DAILY    Type 1 diabetes mellitus without complication (H)       * insulin glargine 100 UNIT/ML injection    LANTUS    10 mL    Inject subcutaneous 10 to 15 units twice daily.    Type 1 diabetes mellitus without complication (H)       ONETOUCH DELICA LANCETS 33G Misc     300 each    USE TO TEST 10 TIMES DAILY OR AS DIRECTED.    Type 1 diabetes mellitus without complication (H)       * ONETOUCH VERIO IQ test strip   Generic drug:  blood glucose monitoring     300 strip    TEST BLOOD SUGARS 10 TIMES DAILY OR AS DIRECTED    Type 1 diabetes mellitus without complication (H)       * blood glucose monitoring test strip    ONETOUCH VERIO IQ    300 strip    USE TO TEST 10 TIMES DAILY OR AS DIRECTED    Type 1 diabetes mellitus without complication (H)       oxyCODONE IR 5 MG tablet    ROXICODONE    30 tablet    Take 1 tablet (5 mg) by mouth every 6 hours as needed for severe pain    Closed fracture of distal end of left radius, unspecified fracture morphology, initial encounter       STATIN NOT " PRESCRIBED (INTENTIONAL)     0 each    Statin not prescribed intentionally due to Intolerance (with supporting documentation of trying a statin at least once within the last 5 years)    Type 1 diabetes mellitus without complication (H)       * Notice:  This list has 10 medication(s) that are the same as other medications prescribed for you. Read the directions carefully, and ask your doctor or other care provider to review them with you.

## 2018-11-06 NOTE — LETTER
"    11/6/2018         RE: Codey Simon  [address]  [facility] [address]        Dear Colleague,    Thank you for referring your patient, Codey Simon, to the [facility]. Please see a copy of my visit note below.    Codey Simon is a 42 year old year old male who is seen for  a left side wrist injury.    Mechanism: The patient reports he fell down 5-6 cement stairs 11/2/18 and is unsure if he lost consciousness. He developed pain in his left wrist and posterior neck pain following this injury.     He reports having significant pain/discomfort around the wrist.   Other symptoms: pain in the neck helped by norco +Flexeril, but has a burning pain around the upper chest on both sides.    Previous treatment: [place] [facility] for a \" non-displaced distal radius fracture\"    Past medical history: See patient history on EMR   Past Medical History:   Diagnosis Date     diabetes type I     diagnosed at 18 y.o.      No past surgical history on file.     Physical Exam  The splint was removed  There is moderate swelling of the wrist   There is  tenderness over the distal radius.  There is no maceration of the skin.  There is no deformity in the area.  Range of motion: not tested and most movements are painful.  CMS intact in bilateral upper extremities.  No focal deficits  Some limitations of cervical range of motion, and now exacerbation of the upper chest symptoms, and no radicular symptoms.      X-rays:  Demonstrate a distal radius fracture with neutral angulation, no displacement. Some ulnar fossa comminution. No apparent dorsal comminution  Distal ulna fracture?:  no        CT scan of the neck was negative for fracture, and no mention of the discs was made.    Assessment:  Distal radius fracture   Mild/no displacement.  Not enough to warrant reduction at this time.  Cervical sprain, unsure of the cause of the the upper chest burning.    Plan:  Immobilization: a well molded short arm cast " applied  Neurosurgery consultation  Soft collar issued.  Don't wear full time.  Worsening of the upper chest burning should prompt a trip to the ER again  Oxycodone prescription and Flexeril refill prescription written.    Activity restrictions:  No lifting, pushing or contact activities   Was instructed to keep the cast clean and dry.     Pain Control:   appropriate doses of OTC Tylenol/NSAIDS discussed, to be used as needed, discussed elevation of the affected extemity above the level of the heart as much as possible to help reduce swelling and apply ice to the affected area as discussed  Oxycodone prescription written  Cervical collar    Return to clinic in 2 week(s).  Xrays in the cast at that time.      ZULEIMA Wells MD  Dept. Orthopedic Surgery  [facility]        Again, thank you for allowing me to participate in the care of your patient.        Sincerely,        Noah Wells MD

## 2018-11-27 ENCOUNTER — OFFICE VISIT (OUTPATIENT)
Dept: ORTHOPEDICS | Facility: CLINIC | Age: 43
End: 2018-11-27
Payer: COMMERCIAL

## 2018-11-27 ENCOUNTER — RADIANT APPOINTMENT (OUTPATIENT)
Dept: GENERAL RADIOLOGY | Facility: CLINIC | Age: 43
End: 2018-11-27
Attending: ORTHOPAEDIC SURGERY
Payer: COMMERCIAL

## 2018-11-27 VITALS
DIASTOLIC BLOOD PRESSURE: 79 MMHG | HEIGHT: 68 IN | OXYGEN SATURATION: 99 % | WEIGHT: 162 LBS | SYSTOLIC BLOOD PRESSURE: 122 MMHG | HEART RATE: 87 BPM | BODY MASS INDEX: 24.55 KG/M2

## 2018-11-27 DIAGNOSIS — S52.502D CLOSED FRACTURE OF DISTAL END OF LEFT RADIUS WITH ROUTINE HEALING, UNSPECIFIED FRACTURE MORPHOLOGY, SUBSEQUENT ENCOUNTER: Primary | ICD-10-CM

## 2018-11-27 DIAGNOSIS — M25.532 LEFT WRIST PAIN: ICD-10-CM

## 2018-11-27 PROCEDURE — 73110 X-RAY EXAM OF WRIST: CPT | Mod: LT

## 2018-11-27 PROCEDURE — 99207 ZZC FRACTURE CARE IN GLOBAL PERIOD: CPT | Performed by: ORTHOPAEDIC SURGERY

## 2018-11-27 NOTE — PROGRESS NOTES
"SUBJECTIVE:   Codey Simon is here in follow up of LEFT distal radius fracture from an injury that occurred on 11/2/18. It has been approximately 3.5 weeks since the initial injury. His pain is minimal. Cast is loose, complains of smell, but likes the looseness..     Review of Systems:  Constitutional/General: Negative for fever, chills, change in weight  Integumentary/Skin: Negative for worrisome rashes, moles, or lesions  Neuro: Negative for weakness, dizziness, or paresthesias   Psychiatric: negative for changes in mood or affect    OBJECTIVE:  Physical Exam:  /79 (BP Location: Left arm, Patient Position: Sitting, Cuff Size: Adult Regular)  Pulse 87  Ht 1.727 m (5' 8\")  Wt 73.5 kg (162 lb)  SpO2 99%  BMI 24.63 kg/m2  General Appearance: healthy, alert and no distress   Skin: no suspicious lesions or rashes  Neuro: Normal strength and tone, mentation intact and speech normal  Vascular: good pulses, and capillary refill   Lymph: no lymphadenopathy   Psych:  mentation appears normal and affect normal/bright  Resp: no increased work of breathing    Left Wrist Exam:  Inspection: Cast removed because it was loose. Skin looks good.   Effusion: mild  ROM: Pain with limited movements  Tender: radial side of the radius       XRAY:  Obtained 11/27/18 of the LEFT wrist: 3-views, in cast, reviewed in the office with the patient by myself today and show essentially no change in position of the fractures. Fracture line is still evident, not much healing noted.     ASSESSMENT:   Healing distal radius fracture    PLAN:   Discussed casting vs. bracing, short term, long term risks associated with displacement of the fracture, including need for future surgery, possible reduced ROM, and development of arthritis. Following discussion, patient agreed to re cast today.     Return to clinic: 3 weeks for follow up and repeat X-Rays out of splint/cast. Can remove cast at that time.     ZULEIMA Wells MD  Dept. Orthopedic " Surgery  [facility]     This document serves as a record of the services and decisions personally performed and made by Dr. ZULEIMA Wells MD. It was created on his behalf by Angelita Epstein, a trained medical scribe. The creation of this record is based on the provider's personal observations and the statements of the patient. This document has been checked and approved by the attending provider.   Angelita Epstein November 27, 2018 3:27 PM

## 2018-11-27 NOTE — MR AVS SNAPSHOT
"              After Visit Summary   11/27/2018    Codey Simon    MRN: [id number]           Patient Information     Date Of Birth          1975        Visit Information        Provider Department      11/27/2018 2:30 PM Noah Wells MD [facility]        Today's Diagnoses     Closed fracture of distal end of left radius with routine healing, unspecified fracture morphology, subsequent encounter    -  1    Left wrist pain           Follow-ups after your visit        Who to contact     If you have questions or need follow up information about today's clinic visit or your schedule please contact [facility] directly at [phone].  Normal or non-critical lab and imaging results will be communicated to you by MyChart, letter or phone within 4 business days after the clinic has received the results. If you do not hear from us within 7 days, please contact the clinic through MyChart or phone. If you have a critical or abnormal lab result, we will notify you by phone as soon as possible.  Submit refill requests through [employer] or call your pharmacy and they will forward the refill request to us. Please allow 3 business days for your refill to be completed.          Additional Information About Your Visit        Your Vitals Were     Pulse Height Pulse Oximetry BMI (Body Mass Index)          87 1.727 m (5' 8\") 99% 24.63 kg/m2         Blood Pressure from Last 3 Encounters:   11/27/18 122/79   11/06/18 (!) 151/93   03/12/18 126/71    Weight from Last 3 Encounters:   11/27/18 73.5 kg (162 lb)   11/06/18 73.5 kg (162 lb)   03/12/18 73.5 kg (162 lb)                 Today's Medication Changes          These changes are accurate as of 11/27/18  6:54 PM.  If you have any questions, ask your nurse or doctor.               These medicines have changed or have updated prescriptions.        Dose/Directions    HYDROcodone-acetaminophen 5-325 MG tablet   Commonly known as:  NORCO   This may " have changed:  Another medication with the same name was removed. Continue taking this medication, and follow the directions you see here.   Changed by:  Noah Wells MD        TAKE 1 TABLET BY MOUTH EVERY 6 HOURS AS NEEDED FOR PAIN   Refills:  0                Primary Care Provider Office Phone # Fax #    Nadeem Urban Nino [phone] [address] [facility] [id number]        Equal Access to Services     [facility]: Hadii aad ku hadasho Soomaali, waaxda luqadaha, qaybta kaalmada adeegyada, waxay idiin hayaan adeeg kharash la'aan . So [facility] [phone].    ATENCIÓN: Si habla espjuan, tiene a cox disposición servicios gratuitos de asistencia lingüística. Llame al [phone].    We comply with applicable federal civil rights laws and [place] laws. We do not discriminate on the basis of race, color, national origin, age, disability, sex, sexual orientation, or gender identity.            Thank you!     Thank you for choosing [facility]  for your care. Our goal is always to provide you with excellent care. Hearing back from our patients is one way we can continue to improve our services. Please take a few minutes to complete the written survey that you may receive in the mail after your visit with us. Thank you!             Your Updated Medication List - Protect others around you: Learn how to safely use, store and throw away your medicines at www.disposemymeds.org.          This list is accurate as of 11/27/18  6:54 PM.  Always use your most recent med list.                   Brand Name Dispense Instructions for use Diagnosis    ACE/ARB/ARNI NOT PRESCRIBED    INTENTIONAL     Please choose reason not prescribed, below    Type 1 diabetes mellitus without complication (H)       alcohol swab prep pads     300 each    Use to swab area of injection/lukas as directed    Type 1 diabetes mellitus without complication (H)       blood glucose calibration solution    NO  "BRAND SPECIFIED    1 Bottle    Use to calibrate blood glucose monitor as needed as directed.  Dispense One Touch Verio IQ brand or per insurance.    Type 1 diabetes mellitus without complication (H)       blood glucose monitoring meter device kit    NO BRAND SPECIFIED    1 kit    Use to test blood sugar 10 times daily or as directed.  Dispense One Touch Verio IQ brand or per insurance.    Type 1 diabetes mellitus without complication (H)       ciclopirox 0.77 % cream    LOPROX    90 g    APPLY TOPICALLY 2 TIMES DAILY AS NEEDED.    Tinea pedis of both feet       * cyclobenzaprine 10 MG tablet    FLEXERIL     TAKE 1 TABLET BY MOUTH 3 TIMES DAILY AS NEEDED FOR MUSCLE SPASM        * cyclobenzaprine 5 MG tablet    FLEXERIL    40 tablet    Take 2 tablets (10 mg) by mouth 3 times daily as needed for muscle spasms    Cervical sprain, initial encounter       diclofenac 75 MG EC tablet    VOLTAREN    60 tablet    Take 1 tablet (75 mg) by mouth 2 times daily    Adhesive capsulitis of left shoulder       HYDROcodone-acetaminophen 5-325 MG tablet    NORCO     TAKE 1 TABLET BY MOUTH EVERY 6 HOURS AS NEEDED FOR PAIN        ibuprofen 800 MG tablet    ADVIL/MOTRIN     TAKE 1 TABLET BY MOUTH EVERY 8 HOURS AS NEEDED        insulin lispro 100 UNIT/ML vial    humaLOG    60 mL    INJECT 65 UNITS SUB-Q DAILY ON A SLIDING SCALE AS DIRECTED 3months supply please    Type 1 diabetes mellitus without complication (H)       insulin pen needle 31G X 8 MM miscellaneous    B-D U/F    200 each    Use to inject twice daily    Type 1 diabetes mellitus without complication (H)       * insulin syringe-needle U-100 31G X 5/16\" 0.3 ML miscellaneous    BD insulin syringe ultrafine    400 each    Use 3 syringes daily or as directed.    Type 1 diabetes mellitus without complication (H)       * BD insulin syringe ultrafine 31G X 5/16\" 0.3 ML miscellaneous   Generic drug:  insulin syringe-needle U-100     400 each    USE ONE SYRINGE FOUR TIMES DAILY OR AS " DIRECETED    Type 1 diabetes mellitus without complication (H)       * LANTUS VIAL 100 UNIT/ML vial   Generic drug:  insulin glargine     20 mL    INJECT 10-15 UNITS SUB-Q TWICE DAILY    Type 1 diabetes mellitus without complication (H)       * insulin glargine 100 UNIT/ML vial    LANTUS VIAL    10 mL    Inject subcutaneous 10 to 15 units twice daily.    Type 1 diabetes mellitus without complication (H)       ONETOUCH DELICA LANCETS 33G Misc     300 each    USE TO TEST 10 TIMES DAILY OR AS DIRECTED.    Type 1 diabetes mellitus without complication (H)       * ONETOUCH VERIO IQ test strip   Generic drug:  blood glucose monitoring     300 strip    TEST BLOOD SUGARS 10 TIMES DAILY OR AS DIRECTED    Type 1 diabetes mellitus without complication (H)       * blood glucose monitoring test strip    ONETOUCH VERIO IQ    300 strip    USE TO TEST 10 TIMES DAILY OR AS DIRECTED    Type 1 diabetes mellitus without complication (H)       oxyCODONE 5 MG tablet    ROXICODONE    30 tablet    Take 1 tablet (5 mg) by mouth every 6 hours as needed for severe pain    Closed fracture of distal end of left radius, unspecified fracture morphology, initial encounter       STATIN NOT PRESCRIBED    INTENTIONAL    0 each    Statin not prescribed intentionally due to Intolerance (with supporting documentation of trying a statin at least once within the last 5 years)    Type 1 diabetes mellitus without complication (H)       * Notice:  This list has 8 medication(s) that are the same as other medications prescribed for you. Read the directions carefully, and ask your doctor or other care provider to review them with you.

## 2018-11-27 NOTE — LETTER
"    11/27/2018         RE: Codey Simon  [address] [place] [address]        Dear Colleague,    Thank you for referring your patient, Codey Simon, to the [facility]. Please see a copy of my visit note below.    SUBJECTIVE:   Codey Simon is here in follow up of LEFT distal radius fracture from an injury that occurred on 11/2/18. It has been approximately 3.5 weeks since the initial injury. His pain is minimal. Cast is loose, complains of smell, but likes the looseness..     Review of Systems:  Constitutional/General: Negative for fever, chills, change in weight  Integumentary/Skin: Negative for worrisome rashes, moles, or lesions  Neuro: Negative for weakness, dizziness, or paresthesias   Psychiatric: negative for changes in mood or affect    OBJECTIVE:  Physical Exam:  /79 (BP Location: Left arm, Patient Position: Sitting, Cuff Size: Adult Regular)  Pulse 87  Ht 1.727 m (5' 8\")  Wt 73.5 kg (162 lb)  SpO2 99%  BMI 24.63 kg/m2  General Appearance: healthy, alert and no distress   Skin: no suspicious lesions or rashes  Neuro: Normal strength and tone, mentation intact and speech normal  Vascular: good pulses, and capillary refill   Lymph: no lymphadenopathy   Psych:  mentation appears normal and affect normal/bright  Resp: no increased work of breathing    Left Wrist Exam:  Inspection: Cast removed because it was loose. Skin looks good.   Effusion: mild  ROM: Pain with limited movements  Tender: radial side of the radius       XRAY:  Obtained 11/27/18 of the LEFT wrist: 3-views, in cast, reviewed in the office with the patient by myself today and show essentially no change in position of the fractures. Fracture line is still evident, not much healing noted.     ASSESSMENT:   Healing distal radius fracture    PLAN:   Discussed casting vs. bracing, short term, long term risks associated with displacement of the fracture, including need for future surgery, possible reduced " ROM, and development of arthritis. Following discussion, patient agreed to re cast today.     Return to clinic: 3 weeks for follow up and repeat X-Rays out of splint/cast. Can remove cast at that time.     ZULEIMA Wells MD  Dept. Orthopedic Surgery  [facility]     This document serves as a record of the services and decisions personally performed and made by Dr. ZULEIMA Wells MD. It was created on his behalf by Angelita Epstein, a trained medical scribe. The creation of this record is based on the provider's personal observations and the statements of the patient. This document has been checked and approved by the attending provider.   Angelita Epstein November 27, 2018 3:27 PM      Again, thank you for allowing me to participate in the care of your patient.        Sincerely,        Noah Wells MD

## 2018-12-13 NOTE — PROGRESS NOTES
"Coedy Simon is here in follow up of LEFT distal radius fracture from an injury that occurred on 11/2/18. It has been approximately 6 weeks since the initial injury.   Present symptoms: Still sore, painful with movement.     Review of Systems:  Constitutional/General: Negative for fever, chills, change in weight  Integumentary/Skin: Negative for worrisome rashes, moles, or lesions  Neuro: Negative for weakness, dizziness, or paresthesias   Psychiatric: negative for changes in mood or affect    OBJECTIVE:  Exam:  /87 (BP Location: Right arm, Patient Position: Sitting, Cuff Size: Adult Regular)   Pulse 105   Ht 1.727 m (5' 8\")   Wt 73.5 kg (162 lb)   SpO2 99%   BMI 24.63 kg/m     General: Well appearing, awake, alert,  oriented to place, in no apparent distress with respirations unlabored.    Skin: No apparent rashes, lesions, or ulcerations; no palpable induration or subcutaneous nodules in area examined around the cast. He does have noticeable dry, flaking skin on the left forearm and hand.     Musculoskeletal:  Cast is in good condition, fitting well. Removed for exam.   Non-tender over the fracture site.   Strength:  strength adequate  ROM: significant limitations with ROM    X-rays:   Obtained today show the fracture maintaining position. Fracture line is still fairly visible. Possible vascular calcifications noted.     ASSESSMENT:   Healing distal radius fracture.     PLAN:  Doing well. Instructed to start hand exercises. Velcro wrist brace was given. Avoid heavy work for at least 4 more weeks. Discussed possible vascular calcifications indicating possible vascular disease that could be affecting his healing. Recommended he follow up with his primary care doctor to further discuss this finding.       Return to clinic in 4 weeks xrays to be done and for reexamintaion.    ZULEIMA Wells MD  Dept. Orthopedic Surgery  [facility]     This document serves as a record of the services " and decisions personally performed and made by Dr. ZULEIMA Wells MD. It was created on his behalf by Angelita Epstein, a trained medical scribe. The creation of this record is based on the provider's personal observations and the statements of the patient. This document has been checked and approved by the attending provider.   Angelita Epstein December 18, 2018 3:07 PM

## 2018-12-18 ENCOUNTER — OFFICE VISIT (OUTPATIENT)
Dept: ORTHOPEDICS | Facility: CLINIC | Age: 43
End: 2018-12-18
Payer: COMMERCIAL

## 2018-12-18 ENCOUNTER — ANCILLARY PROCEDURE (OUTPATIENT)
Dept: GENERAL RADIOLOGY | Facility: CLINIC | Age: 43
End: 2018-12-18
Attending: ORTHOPAEDIC SURGERY
Payer: COMMERCIAL

## 2018-12-18 VITALS
OXYGEN SATURATION: 99 % | WEIGHT: 162 LBS | HEIGHT: 68 IN | HEART RATE: 105 BPM | BODY MASS INDEX: 24.55 KG/M2 | SYSTOLIC BLOOD PRESSURE: 129 MMHG | DIASTOLIC BLOOD PRESSURE: 87 MMHG

## 2018-12-18 DIAGNOSIS — S52.502D CLOSED FRACTURE OF DISTAL END OF LEFT RADIUS WITH ROUTINE HEALING, UNSPECIFIED FRACTURE MORPHOLOGY, SUBSEQUENT ENCOUNTER: Primary | ICD-10-CM

## 2018-12-18 DIAGNOSIS — M25.532 LEFT WRIST PAIN: ICD-10-CM

## 2018-12-18 PROCEDURE — 73110 X-RAY EXAM OF WRIST: CPT | Mod: LT

## 2018-12-18 PROCEDURE — 99207 ZZC FRACTURE CARE IN GLOBAL PERIOD: CPT | Performed by: ORTHOPAEDIC SURGERY

## 2018-12-18 ASSESSMENT — MIFFLIN-ST. JEOR: SCORE: 1609.33

## 2018-12-18 NOTE — LETTER
"    12/18/2018         RE: Codey Simon  [address] [place] [address]        Dear Colleague,    Thank you for referring your patient, Codey Simon, to the [facility]. Please see a copy of my visit note below.    Codey Simon is here in follow up of LEFT distal radius fracture from an injury that occurred on 11/2/18. It has been approximately  6 weeks since the initial injury.   Present symptoms: Still sore, painful with movement.     Review of Systems:  Constitutional/General: Negative for fever, chills, change in weight  Integumentary/Skin: Negative for worrisome rashes, moles, or lesions  Neuro: Negative for weakness, dizziness, or paresthesias   Psychiatric: negative for changes in mood or affect    OBJECTIVE:  Exam:  /87 (BP Location: Right arm, Patient Position: Sitting, Cuff Size: Adult Regular)   Pulse 105   Ht 1.727 m (5' 8\")   Wt 73.5 kg (162 lb)   SpO2 99%   BMI 24.63 kg/m      General: Well appearing, awake, alert,  oriented to place, in no apparent distress with respirations unlabored.    Skin: No apparent rashes, lesions, or ulcerations; no palpable induration or subcutaneous nodules in area examined around the cast. He does have noticeable dry, flaking skin on the left forearm and hand.     Musculoskeletal:  Cast is in good condition, fitting well. Removed for exam.   Non-tender over the fracture site.   Strength:  strength adequate  ROM: significant limitations with ROM    X-rays:   Obtained today show the fracture maintaining position. Fracture line is still fairly visible. Possible vascular calcifications noted.     ASSESSMENT:   Healing distal radius fracture.     PLAN:  Doing well. Instructed to start hand exercises. Velcro wrist brace was given. Avoid heavy work for at least 4 more weeks. Discussed possible vascular calcifications indicating possible vascular disease that could be affecting his healing. Recommended he follow up with his primary " care doctor to further discuss this finding.       Return to clinic in 4 weeks xrays to be done and for reexamintaion.    ZULEIMA Wells MD  Dept. Orthopedic Surgery  [facility]     This document serves as a record of the services and decisions personally performed and made by Dr. ZULEIMA Wells MD. It was created on his behalf by Angelita Epstein, a trained medical scribe. The creation of this record is based on the provider's personal observations and the statements of the patient. This document has been checked and approved by the attending provider.   Angelita Epstein December 18, 2018 3:07 PM    Again, thank you for allowing me to participate in the care of your patient.        Sincerely,        Noah Wells MD

## 2019-01-02 ENCOUNTER — TELEPHONE (OUTPATIENT)
Dept: FAMILY MEDICINE | Facility: CLINIC | Age: 44
End: 2019-01-02

## 2019-01-02 DIAGNOSIS — E10.9 TYPE 1 DIABETES MELLITUS WITHOUT COMPLICATION (H): ICD-10-CM

## 2019-01-02 RX ORDER — INSULIN GLARGINE 100 [IU]/ML
INJECTION, SOLUTION SUBCUTANEOUS
Qty: 10 ML | Refills: 2 | Status: CANCELLED | OUTPATIENT
Start: 2019-01-02

## 2019-01-02 NOTE — TELEPHONE ENCOUNTER
Reason for Call:  Medication or medication refill:    Do you use a [facility]?  Name of the pharmacy and phone number for the current request:  [facility] [place]    Name of the medication requested: insulin lispro (HUMALOG) 100 UNIT/ML injection, LANTUS VIAL 100 UNIT/ML soln, ONETOUCH VERIO IQ test strip    Other request: PATIENT HAS AN APPOINTMENT FOR FOLLOW UP IN MARCH.      Can we leave a detailed message on this number? YES    Phone number patient can be reached at: Home number on file [phone] (home)    Best Time: ANY    Call taken on 1/2/2019 at 3:31 PM by Zahra Salas

## 2019-01-02 NOTE — TELEPHONE ENCOUNTER
This is pt that only wants to be seen once per year.  To provider to advise.  Cheyanne Levin BSN, RN

## 2019-01-03 RX ORDER — INSULIN GLARGINE 100 [IU]/ML
INJECTION, SOLUTION SUBCUTANEOUS
Qty: 10 ML | Refills: 0 | Status: SHIPPED | OUTPATIENT
Start: 2019-01-03 | End: 2019-01-15

## 2019-01-03 NOTE — TELEPHONE ENCOUNTER
Patient has not been seen for diabetic OV since March 2018.  Pending appointment March 2019. RN unable to refill.  Also, sig states patient is to check blood sugars 10 times daily.       RANDA JoeN RN

## 2019-01-15 ENCOUNTER — TELEPHONE (OUTPATIENT)
Dept: FAMILY MEDICINE | Facility: CLINIC | Age: 44
End: 2019-01-15

## 2019-01-15 DIAGNOSIS — E10.9 TYPE 1 DIABETES MELLITUS WITHOUT COMPLICATION (H): ICD-10-CM

## 2019-01-15 RX ORDER — INSULIN GLARGINE 100 [IU]/ML
INJECTION, SOLUTION SUBCUTANEOUS
Qty: 10 ML | Refills: 2 | Status: SHIPPED | OUTPATIENT
Start: 2019-01-15 | End: 2019-03-12

## 2019-01-15 NOTE — TELEPHONE ENCOUNTER
Patient is calling to request refill RX: lantus, requesting 2 refills. Patient would not give phone number for call back, states doesn't want phone on file and was not a problem ever. Thank you.

## 2019-01-22 ENCOUNTER — OFFICE VISIT (OUTPATIENT)
Dept: ORTHOPEDICS | Facility: CLINIC | Age: 44
End: 2019-01-22
Payer: COMMERCIAL

## 2019-01-22 ENCOUNTER — ANCILLARY PROCEDURE (OUTPATIENT)
Dept: GENERAL RADIOLOGY | Facility: CLINIC | Age: 44
End: 2019-01-22
Payer: COMMERCIAL

## 2019-01-22 DIAGNOSIS — S52.502D CLOSED FRACTURE OF DISTAL END OF LEFT RADIUS WITH ROUTINE HEALING, UNSPECIFIED FRACTURE MORPHOLOGY, SUBSEQUENT ENCOUNTER: ICD-10-CM

## 2019-01-22 DIAGNOSIS — M75.02 ADHESIVE CAPSULITIS OF LEFT SHOULDER: ICD-10-CM

## 2019-01-22 DIAGNOSIS — S52.502D CLOSED FRACTURE OF DISTAL END OF LEFT RADIUS WITH ROUTINE HEALING, UNSPECIFIED FRACTURE MORPHOLOGY, SUBSEQUENT ENCOUNTER: Primary | ICD-10-CM

## 2019-01-22 PROCEDURE — 73110 X-RAY EXAM OF WRIST: CPT | Mod: LT

## 2019-01-22 PROCEDURE — 99207 ZZC FRACTURE CARE IN GLOBAL PERIOD: CPT | Performed by: ORTHOPAEDIC SURGERY

## 2019-01-22 RX ORDER — DICLOFENAC SODIUM 75 MG/1
75 TABLET, DELAYED RELEASE ORAL 2 TIMES DAILY
Qty: 60 TABLET | Refills: 3 | Status: SHIPPED | OUTPATIENT
Start: 2019-01-22

## 2019-01-22 ASSESSMENT — MIFFLIN-ST. JEOR: SCORE: 1627.01

## 2019-01-22 NOTE — LETTER
"    1/22/2019         RE: Codey Simon  [address] [place] [address]        Dear Colleague,    Thank you for referring your patient, Codey Simon, to the [facility]. Please see a copy of my visit note below.    SUBJECTIVE:  Codey Simon is here for follow up of LEFT closed distal radius fracture. It has been approximately 12 weeks since the injury. He has been trying to extend his movement to normal and has some pains, but attributes this to the swelling that is still present. He is wearing the brace most of the time. If he uses the wrist too much his 4th and 5th fingers get numb. The numbness is \"clearly\" only the 4th and 5th fingers.     Review of Systems:  Constitutional/General: Negative for fever, chills, change in weight  Integumentary/Skin: Negative for worrisome rashes, moles, or lesions  Neuro: Negative for weakness, dizziness, or paresthesias   Psychiatric: negative for changes in mood or affect    OBJECTIVE:  Physical Exam:  BP (!) (P) 140/92 (BP Location: Left arm, Patient Position: Sitting, Cuff Size: Adult Regular)   Pulse (P) 85   Ht (P) 1.727 m (5' 8\")   Wt (P) 75.8 kg (167 lb)   SpO2 (P) 99%   BMI (P) 25.39 kg/m     General Appearance: healthy, alert and no distress   Skin: no suspicious lesions or rashes  Neuro:  strength is weak  Vascular: good pulses, and cappillary refill   Lymph: no lymphadenopathy   Psych:  mentation appears normal and affect normal/bright  Resp: no increased work of breathing    Left Wrist Exam:  Inspection: wound well healed  Effusion: mild swelling compared to opposite side  Tinel's sign: Negative  ROM:   Dorsiflexion: 60 deg  Forward Flexion: 20 degrees  Radial deviation: Limited              Pronation:  Full              Supination:  70 degrees              Fist: Difficult to form full fist              Grind test: Not tested  Tender:  minimal at distal radius    X-Ray  Obtained 1/22/19 of the LEFT wrist: 3-views, reviewed in the " office with the patient by myself today and show no change in position of the fracture, (.  with slight loss of radial inclination, and slight dorsal tilt)the fracture line is less visible than on previous x-rays.     ASSESSMENT:   Essentially healed distal radius fracture with residual wrist and hand stiffness.     PLAN:   Work on ROM of wrist and fingers.   Hand therapy was ordered today.   Patient requests a refill of his diclofenac prescription.   Advised that he should be using the wrist brace if lifting more than a few pounds.   Discussed stopping smoking.  He wondered how I knew about his smoking, but he smelled strongly of tobacco.    Return to clinic: 4 weeks in clinic or call with emy.     ZULEIMA Wells MD  Dept. Orthopedic Surgery  [facility]    This document serves as a record of the services and decisions personally performed and made by Dr. ZULEIMA Wells MD. It was created on his behalf by Angelita Epstein, a trained medical scribe. The creation of this record is based on the provider's personal observations and the statements of the patient. This document has been checked and approved by the attending provider.   Angelita Epstein January 22, 2019 2:41 PM      Again, thank you for allowing me to participate in the care of your patient.        Sincerely,        Noah Wells MD

## 2019-01-22 NOTE — PROGRESS NOTES
"SUBJECTIVE:  Codey Simon is here for follow up of LEFT closed distal radius fracture. It has been approximately 12 weeks since the injury. He has been trying to extend his movement to normal and has some pains, but attributes this to the swelling that is still present. He is wearing the brace most of the time. If he uses the wrist too much his 4th and 5th fingers get numb. The numbness is \"clearly\" only the 4th and 5th fingers.     Review of Systems:  Constitutional/General: Negative for fever, chills, change in weight  Integumentary/Skin: Negative for worrisome rashes, moles, or lesions  Neuro: Negative for weakness, dizziness, or paresthesias   Psychiatric: negative for changes in mood or affect    OBJECTIVE:  Physical Exam:  BP (!) (P) 140/92 (BP Location: Left arm, Patient Position: Sitting, Cuff Size: Adult Regular)   Pulse (P) 85   Ht (P) 1.727 m (5' 8\")   Wt (P) 75.8 kg (167 lb)   SpO2 (P) 99%   BMI (P) 25.39 kg/m    General Appearance: healthy, alert and no distress   Skin: no suspicious lesions or rashes  Neuro:  strength is weak  Vascular: good pulses, and cappillary refill   Lymph: no lymphadenopathy   Psych:  mentation appears normal and affect normal/bright  Resp: no increased work of breathing    Left Wrist Exam:  Inspection: wound well healed  Effusion: mild swelling compared to opposite side  Tinel's sign: Negative  ROM:   Dorsiflexion: 60 deg  Forward Flexion: 20 degrees  Radial deviation: Limited              Pronation: Full              Supination: 70 degrees              Fist: Difficult to form full fist              Grind test: Not tested  Tender: minimal at distal radius    X-Ray  Obtained 1/22/19 of the LEFT wrist: 3-views, reviewed in the office with the patient by myself today and show no change in position of the fracture, (.  with slight loss of radial inclination, and slight dorsal tilt)the fracture line is less visible than on previous x-rays.     ASSESSMENT:   Essentially " healed distal radius fracture with residual wrist and hand stiffness.     PLAN:   Work on ROM of wrist and fingers.   Hand therapy was ordered today.   Patient requests a refill of his diclofenac prescription.   Advised that he should be using the wrist brace if lifting more than a few pounds.   Discussed stopping smoking.  He wondered how I knew about his smoking, but he smelled strongly of tobacco.    Return to clinic: 4 weeks in clinic or call with emy.     ZULEIMA Wells MD  Dept. Orthopedic Surgery  [facility]    This document serves as a record of the services and decisions personally performed and made by Dr. ZULEIMA Wells MD. It was created on his behalf by Angelita Epstein, a trained medical scribe. The creation of this record is based on the provider's personal observations and the statements of the patient. This document has been checked and approved by the attending provider.   Angelita Epstein January 22, 2019 2:41 PM

## 2019-03-12 ENCOUNTER — OFFICE VISIT (OUTPATIENT)
Dept: FAMILY MEDICINE | Facility: CLINIC | Age: 44
End: 2019-03-12
Payer: COMMERCIAL

## 2019-03-12 ENCOUNTER — TELEPHONE (OUTPATIENT)
Dept: FAMILY MEDICINE | Facility: CLINIC | Age: 44
End: 2019-03-12

## 2019-03-12 VITALS
RESPIRATION RATE: 16 BRPM | DIASTOLIC BLOOD PRESSURE: 70 MMHG | OXYGEN SATURATION: 98 % | HEART RATE: 81 BPM | TEMPERATURE: 98.2 F | WEIGHT: 169 LBS | HEIGHT: 68 IN | SYSTOLIC BLOOD PRESSURE: 128 MMHG | BODY MASS INDEX: 25.61 KG/M2

## 2019-03-12 DIAGNOSIS — E10.9 TYPE 1 DIABETES MELLITUS WITHOUT COMPLICATION (H): Primary | ICD-10-CM

## 2019-03-12 DIAGNOSIS — K29.70 GASTRITIS WITHOUT BLEEDING, UNSPECIFIED CHRONICITY, UNSPECIFIED GASTRITIS TYPE: ICD-10-CM

## 2019-03-12 DIAGNOSIS — Z13.6 CARDIOVASCULAR SCREENING; LDL GOAL LESS THAN 100: ICD-10-CM

## 2019-03-12 DIAGNOSIS — M25.512 LEFT SHOULDER PAIN, UNSPECIFIED CHRONICITY: ICD-10-CM

## 2019-03-12 DIAGNOSIS — M25.532 LEFT WRIST PAIN: ICD-10-CM

## 2019-03-12 LAB
ALBUMIN SERPL-MCNC: 4.1 G/DL (ref 3.4–5)
ANION GAP SERPL CALCULATED.3IONS-SCNC: 8 MMOL/L (ref 3–14)
BUN SERPL-MCNC: 11 MG/DL (ref 7–30)
CALCIUM SERPL-MCNC: 9.2 MG/DL (ref 8.5–10.1)
CHLORIDE SERPL-SCNC: 103 MMOL/L (ref 94–109)
CHOLEST SERPL-MCNC: 208 MG/DL
CO2 SERPL-SCNC: 26 MMOL/L (ref 20–32)
CREAT SERPL-MCNC: 0.77 MG/DL (ref 0.66–1.25)
CREAT UR-MCNC: 26 MG/DL
GFR SERPL CREATININE-BSD FRML MDRD: >90 ML/MIN/{1.73_M2}
GLUCOSE SERPL-MCNC: 149 MG/DL (ref 70–99)
HBA1C MFR BLD: 6.3 % (ref 0–5.6)
HDLC SERPL-MCNC: 60 MG/DL
LDLC SERPL CALC-MCNC: 129 MG/DL
MICROALBUMIN UR-MCNC: 6 MG/L
MICROALBUMIN/CREAT UR: 22.53 MG/G CR (ref 0–17)
NONHDLC SERPL-MCNC: 148 MG/DL
PHOSPHATE SERPL-MCNC: 2 MG/DL (ref 2.5–4.5)
POTASSIUM SERPL-SCNC: 4 MMOL/L (ref 3.4–5.3)
SODIUM SERPL-SCNC: 137 MMOL/L (ref 133–144)
TRIGL SERPL-MCNC: 95 MG/DL

## 2019-03-12 PROCEDURE — 83036 HEMOGLOBIN GLYCOSYLATED A1C: CPT | Performed by: FAMILY MEDICINE

## 2019-03-12 PROCEDURE — 80061 LIPID PANEL: CPT | Performed by: FAMILY MEDICINE

## 2019-03-12 PROCEDURE — 82043 UR ALBUMIN QUANTITATIVE: CPT | Performed by: FAMILY MEDICINE

## 2019-03-12 PROCEDURE — 36415 COLL VENOUS BLD VENIPUNCTURE: CPT | Performed by: FAMILY MEDICINE

## 2019-03-12 PROCEDURE — 99214 OFFICE O/P EST MOD 30 MIN: CPT | Performed by: FAMILY MEDICINE

## 2019-03-12 PROCEDURE — 80069 RENAL FUNCTION PANEL: CPT | Performed by: FAMILY MEDICINE

## 2019-03-12 RX ORDER — INSULIN GLARGINE 100 [IU]/ML
INJECTION, SOLUTION SUBCUTANEOUS
Qty: 10 ML | Refills: 3 | Status: SHIPPED | OUTPATIENT
Start: 2019-03-12 | End: 2019-08-02

## 2019-03-12 ASSESSMENT — MIFFLIN-ST. JEOR: SCORE: 1636.08

## 2019-03-12 NOTE — PROGRESS NOTES
"SUBJECTIVE:  Codey Simon, a 43 year old male scheduled an appointment to discuss the following issues:     Type 1 diabetes mellitus without complication (H)  CARDIOVASCULAR SCREENING; LDL GOAL LESS THAN 100  Taking vitaminD. Exercise hit/miss. No chest pain or shortness of breath.   Had wrist fracture - resulting. Taking diclofenac for pain wrist. Zantac prn in past. Upset stomach. No feet change or black/bloody stools. No urine changes or hematuria.   Eye exam >2 years ago. No blood sugars <40 or LOSS OF CONSCIENCE. No >400 blood sugars.     Past Medical History:   Diagnosis Date     diabetes type I     diagnosed at 18 y.o.       No past surgical history on file.    No family history on file.    Social History     Tobacco Use     Smoking status: Never Smoker     Smokeless tobacco: Never Used   Substance Use Topics     Alcohol use: Yes       ROS:  All other ROS negative    OBJECTIVE:  /70   Pulse 81   Temp 98.2  F (36.8  C) (Oral)   Resp 16   Ht 1.727 m (5' 8\")   Wt 76.7 kg (169 lb)   SpO2 98%   BMI 25.70 kg/m    EXAM:  GENERAL APPEARANCE: healthy, alert and no distress  EYES: EOMI,  PERRL  NECK: no adenopathy, no asymmetry, masses, or scars and thyroid normal to palpation  RESP: lungs clear to auscultation - no rales, rhonchi or wheezes  CV: regular rates and rhythm, normal S1 S2, no S3 or S4 and no murmur, click or rub -  ABDOMEN:  soft, nontender, no HSM or masses and bowel sounds normal  MS: extremities normal- no gross deformities noted, no evidence of inflammation in joints, FROM in all extremities.  NEURO: Normal strength and tone, sensory exam grossly normal, mentation intact and speech normal  PSYCH: mentation appears normal and affect normal/bright    ASSESSMENT / PLAN:  (E10.9) Type 1 diabetes mellitus without complication (H)  (primary encounter diagnosis)  Comment: stable  Plan: Hemoglobin A1c, Renal panel (Alb, BUN, Ca, Cl,         CO2, Creat, Gluc, Phos, K, Na), Albumin Random        "  Urine Quantitative with Creat Ratio, insulin         glargine (LANTUS VIAL) 100 UNIT/ML vial,         insulin lispro (HUMALOG) 100 UNIT/ML vial        Continue closely monitor. Reveiwed risks and side effects of medication  Lisinopril if kidney damage or blood pressure worse.     (Z13.6) CARDIOVASCULAR SCREENING; LDL GOAL LESS THAN 100  Plan: Lipid Profile (Chol, Trig, HDL, LDL calc)        Patient not intersted in statin.     (K29.70) Gastritis without bleeding, unspecified chronicity, unspecified gastritis type  Comment: with diclofenac  Plan: ranitidine (ZANTAC) 300 MG tablet        Prn. Limit ALCOHOL     (M25.532) Left wrist pain  Plan: PHYSICAL THERAPY REFERRAL        Per ortho improving. Has a p.t. In mind near house. Check insurance    (M25.512) Left shoulder pain, unspecified chronicity  Comment: chronic intermittent  Plan: PHYSICAL THERAPY REFERRAL        Wanted to see p.t.. Back to ortho if worse      Nadeem Nino

## 2019-03-12 NOTE — NURSING NOTE
"Chief Complaint   Patient presents with     Diabetes       Initial /70   Pulse 81   Temp 98.2  F (36.8  C) (Oral)   Resp 16   Ht 1.727 m (5' 8\")   Wt 76.7 kg (169 lb)   SpO2 98%   BMI 25.70 kg/m   Estimated body mass index is 25.7 kg/m  as calculated from the following:    Height as of this encounter: 1.727 m (5' 8\").    Weight as of this encounter: 76.7 kg (169 lb).    Cheyanne Pitts, CMA    "

## 2019-03-12 NOTE — TELEPHONE ENCOUNTER
Please mail a copy of Codey's lab results from 03- to his house. Ok per Dr. Nino.    Cheyanne Pitts, CMA

## 2019-03-18 DIAGNOSIS — E10.9 TYPE 1 DIABETES MELLITUS WITHOUT COMPLICATION (H): ICD-10-CM

## 2019-03-19 NOTE — TELEPHONE ENCOUNTER
Pended order is for testing glucose 10 times daily.   Is this what PCP wants?      Madison Forman RN, BSN

## 2019-03-28 ENCOUNTER — TELEPHONE (OUTPATIENT)
Dept: FAMILY MEDICINE | Facility: CLINIC | Age: 44
End: 2019-03-28

## 2019-04-03 NOTE — TELEPHONE ENCOUNTER
PA Initiation    Medication: insulin glargine (LANTUS VIAL) 100 UNIT/ML vial - initiated  Insurance Company: [employer] - Phone [phone] Fax [phone]  Pharmacy Filling the Rx: [id number] [place] - [address]  Filling Pharmacy Phone:    Filling Pharmacy Fax:    Start Date: 4/3/2019

## 2019-04-04 NOTE — TELEPHONE ENCOUNTER
Prior Authorization Approval    Authorization Effective Date: 3/4/2019  Authorization Expiration Date: 4/2/2020  Medication: insulin glargine (LANTUS VIAL) 100 UNIT/ML vial - APPROVED  Approved Dose/Quantity: 30 FOR 90 DAYS   Reference #: [id number]   Insurance Company: [employer] - Phone [phone] Fax [phone]  Expected CoPay:       CoPay Card Available:      Foundation Assistance Needed:    Which Pharmacy is filling the prescription (Not needed for infusion/clinic administered): [id number], [place] - [address]  Pharmacy Notified: Yes  Patient Notified: Yes

## 2019-06-20 DIAGNOSIS — E10.9 TYPE 1 DIABETES MELLITUS WITHOUT COMPLICATION (H): ICD-10-CM

## 2019-07-08 ENCOUNTER — TELEPHONE (OUTPATIENT)
Dept: FAMILY MEDICINE | Facility: CLINIC | Age: 44
End: 2019-07-08

## 2019-07-08 DIAGNOSIS — E10.9 TYPE 1 DIABETES MELLITUS WITHOUT COMPLICATION (H): Primary | ICD-10-CM

## 2019-07-08 NOTE — TELEPHONE ENCOUNTER
Message: One Touch not covered. Pt plan changed and he must choose between Contour Next or Accu Chek so an rx that allows choice is best with dx code. Please send over a new rx for meter, test strips, and lancets.    Sugg Alt: Accu-Chek Smartview Test Strip, Contour Next Test Strip, Accu-Chek Guide Test Strips, Contour Test Strip, Accu-Chk Luisa Plus Test Strp, Accu-Check Compact Plus Strips

## 2019-07-22 ENCOUNTER — TELEPHONE (OUTPATIENT)
Dept: FAMILY MEDICINE | Facility: CLINIC | Age: 44
End: 2019-07-22

## 2019-07-22 NOTE — TELEPHONE ENCOUNTER
Reason for Call:  Form, our goal is to have forms completed with 72 hours, however, some forms may require a visit or additional information.    Type of letter, form or note:  medical    Who is the form from?: Patient    Where did the form come from: Patient or family brought in       What clinic location was the form placed at?: [place]    Where the form was placed: [facility] box Box/Folder    What number is listed as a contact on the form?: [phone]       Additional comments: pt would like form faxed to [facility] public safety and then would like to have the original mail to his house     Call taken on 7/22/2019 at 3:02 PM by Maggie Matias

## 2019-07-24 NOTE — TELEPHONE ENCOUNTER
DMV Form for Diabetic placed in providers basket to complete. Route back to team when done. Thank you.  MARIELLA Chery

## 2019-07-29 NOTE — TELEPHONE ENCOUNTER
Faxed completed from to [place] [employer]. Mailed original to patient.Tried calling patient to inform him this was done, but the mailbox if full and I could not leave a message.Carolina Ward MA/MARIELLA VALERO

## 2019-08-02 ENCOUNTER — TELEPHONE (OUTPATIENT)
Dept: FAMILY MEDICINE | Facility: CLINIC | Age: 44
End: 2019-08-02

## 2019-08-02 DIAGNOSIS — K29.70 GASTRITIS WITHOUT BLEEDING, UNSPECIFIED CHRONICITY, UNSPECIFIED GASTRITIS TYPE: ICD-10-CM

## 2019-08-02 DIAGNOSIS — E10.9 TYPE 1 DIABETES MELLITUS WITHOUT COMPLICATION (H): ICD-10-CM

## 2019-08-02 RX ORDER — INSULIN GLARGINE 100 [IU]/ML
INJECTION, SOLUTION SUBCUTANEOUS
Qty: 10 ML | Refills: 7 | Status: SHIPPED | OUTPATIENT
Start: 2019-08-02 | End: 2020-02-17

## 2019-08-02 NOTE — TELEPHONE ENCOUNTER
Patient is calling to discuss medications before provider goes on leave. Patient would not disclose details. Please call to discuss. Thank you.

## 2019-08-02 NOTE — TELEPHONE ENCOUNTER
Pt requesting refills for lantus and humalog and test strips to get to March.  He states he was seen last March and will come in for appointment again next March.  He states he refuses to come in before that and is requesting Dr. Nino do these refills so he does not have problems when Dr. Nino is out of office.  To provider to advise.  Cheyanne TOLENTINON, RN

## 2019-09-11 DIAGNOSIS — B35.3 TINEA PEDIS OF BOTH FEET: ICD-10-CM

## 2019-09-12 RX ORDER — CICLOPIROX OLAMINE 7.7 MG/G
CREAM TOPICAL
Qty: 90 G | Refills: 2 | Status: SHIPPED | OUTPATIENT
Start: 2019-09-12 | End: 2021-01-07

## 2020-02-04 DIAGNOSIS — K29.70 GASTRITIS WITHOUT BLEEDING, UNSPECIFIED CHRONICITY, UNSPECIFIED GASTRITIS TYPE: ICD-10-CM

## 2020-02-05 NOTE — TELEPHONE ENCOUNTER
Prescription approved per [facility] Refill Protocol #30  APPT NEEDED FOR FURTHER REFILLS  Patient is overdue for an office visit.  Please help the pt schedule an appointment diabetes.  Health Maintenance Due   Topic Date Due     PREVENTIVE CARE VISIT  1975     DTAP/TDAP/TD IMMUNIZATION (1 - Tdap) 12/19/1986     HIV SCREENING  12/19/1990     PNEUMOCOCCAL IMMUNIZATION 19-64 MEDIUM RISK (1 of 1 - PPSV23) 12/19/1994     DIABETIC FOOT EXAM  03/12/2019     EYE EXAM  03/12/2019     TSH W/FREE T4 REFLEX  05/15/2019     INFLUENZA VACCINE (1) 09/01/2019     A1C  09/12/2019     PHQ-2  01/01/2020     Courtney Cotter RN

## 2020-02-17 ENCOUNTER — TELEPHONE (OUTPATIENT)
Dept: FAMILY MEDICINE | Facility: CLINIC | Age: 45
End: 2020-02-17

## 2020-02-17 DIAGNOSIS — K29.70 GASTRITIS WITHOUT BLEEDING, UNSPECIFIED CHRONICITY, UNSPECIFIED GASTRITIS TYPE: ICD-10-CM

## 2020-02-17 DIAGNOSIS — E10.9 TYPE 1 DIABETES MELLITUS WITHOUT COMPLICATION (H): ICD-10-CM

## 2020-02-17 RX ORDER — INSULIN GLARGINE 100 [IU]/ML
INJECTION, SOLUTION SUBCUTANEOUS
Qty: 10 ML | Refills: 0 | Status: SHIPPED | OUTPATIENT
Start: 2020-02-17 | End: 2021-01-25

## 2020-02-17 NOTE — TELEPHONE ENCOUNTER
Reason for Call:  Medication or medication refill:    Do you use a [facility]?  Name of the pharmacy and phone number for the current request:  [facility] [address] [place] [facility], [place] - [address]  [phone]    Name of the medication requested: ranitidine (ZANTAC) 300 MG tablet, and all diabetes medication per pt    Other request: patient is calling would like refill of above medication and all diabetes medication, per patient should not have to name them all provider should know patient has been taking them since age 11. Patient does not have phone and [place], if needed patient will call back to check on status of refill periodically. Thank you.    Can we leave a detailed message on this number? NO    Phone number patient can be reached at: no phone    Best Time:     Call taken on 2/17/2020 at 3:42 PM by Shilpi Flores

## 2020-03-13 ENCOUNTER — TELEPHONE (OUTPATIENT)
Dept: FAMILY MEDICINE | Facility: CLINIC | Age: 45
End: 2020-03-13

## 2020-03-17 ENCOUNTER — TELEPHONE (OUTPATIENT)
Dept: FAMILY MEDICINE | Facility: CLINIC | Age: 45
End: 2020-03-17

## 2020-03-17 NOTE — TELEPHONE ENCOUNTER
Appointment marked at essential by provider. Rerouting to provider for reconsideration. Carmina More TC/Pt Rep

## 2020-03-17 NOTE — TELEPHONE ENCOUNTER
Patient is scheduled to be seen by Dr Nino on 3/19, wants to speak to care team as he does not feel this is an essential appointment. He would like a call back to discuss.

## 2020-03-17 NOTE — TELEPHONE ENCOUNTER
Please call. If blood sugars stable - we can refill meds [id number] and see in late spring/early summer but it's been over one year since seen. Nadeem Nino MD

## 2020-03-31 DIAGNOSIS — K29.70 GASTRITIS WITHOUT BLEEDING, UNSPECIFIED CHRONICITY, UNSPECIFIED GASTRITIS TYPE: ICD-10-CM

## 2020-03-31 DIAGNOSIS — E10.9 TYPE 1 DIABETES MELLITUS WITHOUT COMPLICATION (H): ICD-10-CM

## 2020-04-01 RX ORDER — BLOOD SUGAR DIAGNOSTIC
STRIP MISCELLANEOUS
Qty: 300 STRIP | Refills: 0 | Status: SHIPPED | OUTPATIENT
Start: 2020-04-01 | End: 2020-05-06

## 2020-04-01 NOTE — TELEPHONE ENCOUNTER
Routing refill request to provider for review/approval because:  Patient failed protocol, was due to come in and appointment was canceled by us.  Please advise.  Thank you. Yelena Lopez R.N.

## 2020-04-08 ENCOUNTER — TELEPHONE (OUTPATIENT)
Dept: FAMILY MEDICINE | Facility: CLINIC | Age: 45
End: 2020-04-08

## 2020-04-08 DIAGNOSIS — K29.70 GASTRITIS WITHOUT BLEEDING, UNSPECIFIED CHRONICITY, UNSPECIFIED GASTRITIS TYPE: ICD-10-CM

## 2020-04-10 NOTE — TELEPHONE ENCOUNTER
May try Pepcid or Omeprazole - both over the counter.    Otherwise set up telephone visit with PCP.    Luke Najera M.D.

## 2020-04-10 NOTE — TELEPHONE ENCOUNTER
Left message on answering machine for patient/parent to call back.   [phone].  Courtney Cotter RN

## 2020-04-20 ENCOUNTER — TELEPHONE (OUTPATIENT)
Dept: FAMILY MEDICINE | Facility: CLINIC | Age: 45
End: 2020-04-20

## 2020-04-20 NOTE — TELEPHONE ENCOUNTER
Pharmacy calling regarding Omeprazole. Patient is out of medication and would like a refill but does not want Omeprazole or anything in that class. Requesting Famotidine or Cimetidine. Please call pharmacy to advise. Carmina More TC/Pt Rep

## 2020-04-21 ENCOUNTER — VIRTUAL VISIT (OUTPATIENT)
Dept: FAMILY MEDICINE | Facility: CLINIC | Age: 45
End: 2020-04-21
Payer: COMMERCIAL

## 2020-04-21 DIAGNOSIS — K21.00 GASTROESOPHAGEAL REFLUX DISEASE WITH ESOPHAGITIS: Primary | ICD-10-CM

## 2020-04-21 PROCEDURE — 99213 OFFICE O/P EST LOW 20 MIN: CPT | Mod: 95 | Performed by: FAMILY MEDICINE

## 2020-04-21 RX ORDER — FAMOTIDINE 40 MG/1
40 TABLET, FILM COATED ORAL DAILY PRN
Qty: 90 TABLET | Refills: 1 | Status: SHIPPED | OUTPATIENT
Start: 2020-04-21 | End: 2022-08-03

## 2020-04-21 NOTE — PROGRESS NOTES
"Codey Simon is a 44 year old male who is being evaluated via a billable telephone visit.    Follow-up gerd - overall not too bad. Worse with more food. No dysphagia. No nausea, vomiting or diarrhea or black/blolody stools. Some coffee and likes pop. Likes hot sauce too. Would a couple times week.   No egd. No family history esophageal cancer/stomach cancer.   Sugars running ok. No sugars <40 or LOSS OF CONSCIENCE. No shortness of breath or chest pain.    The patient has been notified of following:     \"This telephone visit will be conducted via a call between you and your physician/provider. We have found that certain health care needs can be provided without the need for a physical exam.  This service lets us provide the care you need with a short phone conversation.  If a prescription is necessary we can send it directly to your pharmacy.  If lab work is needed we can place an order for that and you can then stop by our lab to have the test done at a later time.    Telephone visits are billed at different rates depending on your insurance coverage. During this emergency period, for some insurers they may be billed the same as an in-person visit.  Please reach out to your insurance provider with any questions.    If during the course of the call the physician/provider feels a telephone visit is not appropriate, you will not be charged for this service.\"    Patient has given verbal consent for Telephone visit?  Yes    How would you like to obtain your AVS? Declined     Subjective     Codey Simon is a 44 year old male who presents to clinic today for the following health issues:    Discuss alternate to omeprazole.  Prefers not to take omeprazole now that Zantac is not available.        PROBLEMS TO ADD ON...    Patient Active Problem List   Diagnosis     CARDIOVASCULAR SCREENING; LDL GOAL LESS THAN 100     Tinea pedis     Recurrent cold sores     Type 1 diabetes mellitus without complication (H)     " Dupuytren's contracture     Adhesive capsulitis of left shoulder     History reviewed. No pertinent surgical history.    Social History     Tobacco Use     Smoking status: Never Smoker     Smokeless tobacco: Never Used   Substance Use Topics     Alcohol use: Yes     History reviewed. No pertinent family history.        Reviewed and updated as needed this visit by Provider         Review of Systems   All other ROS negative.        Objective   Reported vitals:  There were no vitals taken for this visit.   healthy, alert and no distress  PSYCH: Alert and oriented times 3; coherent speech, normal   rate and volume, able to articulate logical thoughts, able   to abstract reason, no tangential thoughts, no hallucinations   or delusions  His affect is normal  RESP: No cough, no audible wheezing, able to talk in full sentences  Remainder of exam unable to be completed due to telephone visits    Diagnostic Test Results:  Labs reviewed in Epic        Assessment/Plan:  1. Gastroesophageal reflux disease with esophagitis  Needs help  - famotidine (PEPCID) 40 MG tablet; Take 1 tablet (40 mg) by mouth daily as needed for heartburn  Dispense: 90 tablet; Refill: 1  Patient did NOT want to try ompezole. Patient should call around if can't find pepcid at pharmacy. Consider cimetidine too but older drug. Continue watch diet. Recheck in 2 months  For dm meds/labs recheck. egd if gerd worse - patient not interested at this point. Expected course and warning signs reviewed. Call/email with questions/concerns   No follow-ups on file.      Phone call duration:  13 minutes    Nadeem Nino MD

## 2020-06-02 DIAGNOSIS — E10.9 TYPE 1 DIABETES MELLITUS WITHOUT COMPLICATION (H): ICD-10-CM

## 2020-06-02 RX ORDER — BLOOD SUGAR DIAGNOSTIC
STRIP MISCELLANEOUS
Qty: 300 STRIP | Refills: 3 | Status: SHIPPED | OUTPATIENT
Start: 2020-06-02 | End: 2020-12-23

## 2020-06-02 NOTE — TELEPHONE ENCOUNTER
To Dr. Nadeem Nino to advise on prescription refill for her test strips.  States testing 10x/day.  Last OV for diabetes was 3/12/19  Virtual visit for f/up gerd 4/21/20  Hemoglobin A1C   Date Value Ref Range Status   03/12/2019 6.3 (H) 0 - 5.6 % Final     Comment:     Normal <5.7% Prediabetes 5.7-6.4%  Diabetes 6.5% or higher - adopted from ADA   consensus guidelines.

## 2020-08-02 DIAGNOSIS — E10.9 TYPE 1 DIABETES MELLITUS WITHOUT COMPLICATION (H): ICD-10-CM

## 2020-08-02 NOTE — LETTER
August 4, 2020    Codey Simon  [address] [place] [address]    Dear Codey,       We recently received a refill request for insulin lispro (HUMALOG) 100 UNIT/ML vial .  We have refilled this for a one time 60 day supply only because you are due for a:    Diabetes office visit-appointment needed in the next 1-2 months per Dr Nino.      Please call at your earliest convenience so that there will not be a delay with your future refills.          Thank you,   Your [facility] Team/  [phone]

## 2020-08-03 NOTE — TELEPHONE ENCOUNTER
Routing refill request to provider for review/approval because:  Labs not current:  hgb a1c and cr  Cheyanne Levin BSN, RN

## 2020-08-04 NOTE — TELEPHONE ENCOUNTER
Overdue for md appointment. Please help set-up in next 1-2month. Will do labs at md appointment. Nadeem Nino MD

## 2021-01-07 ENCOUNTER — OFFICE VISIT (OUTPATIENT)
Dept: FAMILY MEDICINE | Facility: CLINIC | Age: 46
End: 2021-01-07
Payer: COMMERCIAL

## 2021-01-07 VITALS
BODY MASS INDEX: 26.67 KG/M2 | OXYGEN SATURATION: 97 % | WEIGHT: 176 LBS | DIASTOLIC BLOOD PRESSURE: 84 MMHG | HEART RATE: 105 BPM | SYSTOLIC BLOOD PRESSURE: 137 MMHG | HEIGHT: 68 IN | TEMPERATURE: 98.4 F

## 2021-01-07 DIAGNOSIS — B35.3 TINEA PEDIS OF BOTH FEET: ICD-10-CM

## 2021-01-07 DIAGNOSIS — Z13.6 CARDIOVASCULAR SCREENING; LDL GOAL LESS THAN 100: ICD-10-CM

## 2021-01-07 DIAGNOSIS — E10.9 TYPE 1 DIABETES MELLITUS WITHOUT COMPLICATION (H): Primary | ICD-10-CM

## 2021-01-07 LAB
ALBUMIN SERPL-MCNC: 4.3 G/DL (ref 3.4–5)
ANION GAP SERPL CALCULATED.3IONS-SCNC: 6 MMOL/L (ref 3–14)
BUN SERPL-MCNC: 13 MG/DL (ref 7–30)
CALCIUM SERPL-MCNC: 9.6 MG/DL (ref 8.5–10.1)
CHLORIDE SERPL-SCNC: 102 MMOL/L (ref 94–109)
CHOLEST SERPL-MCNC: 229 MG/DL
CO2 SERPL-SCNC: 27 MMOL/L (ref 20–32)
CREAT SERPL-MCNC: 0.81 MG/DL (ref 0.66–1.25)
GFR SERPL CREATININE-BSD FRML MDRD: >90 ML/MIN/{1.73_M2}
GLUCOSE SERPL-MCNC: 139 MG/DL (ref 70–99)
HBA1C MFR BLD: 6.7 % (ref 0–5.6)
HDLC SERPL-MCNC: 81 MG/DL
LDLC SERPL CALC-MCNC: 125 MG/DL
NONHDLC SERPL-MCNC: 148 MG/DL
PHOSPHATE SERPL-MCNC: 3.1 MG/DL (ref 2.5–4.5)
POTASSIUM SERPL-SCNC: 3.6 MMOL/L (ref 3.4–5.3)
SODIUM SERPL-SCNC: 135 MMOL/L (ref 133–144)
TRIGL SERPL-MCNC: 114 MG/DL

## 2021-01-07 PROCEDURE — 80061 LIPID PANEL: CPT | Performed by: FAMILY MEDICINE

## 2021-01-07 PROCEDURE — 83036 HEMOGLOBIN GLYCOSYLATED A1C: CPT | Performed by: FAMILY MEDICINE

## 2021-01-07 PROCEDURE — 36415 COLL VENOUS BLD VENIPUNCTURE: CPT | Performed by: FAMILY MEDICINE

## 2021-01-07 PROCEDURE — 82043 UR ALBUMIN QUANTITATIVE: CPT | Performed by: FAMILY MEDICINE

## 2021-01-07 PROCEDURE — 99214 OFFICE O/P EST MOD 30 MIN: CPT | Performed by: FAMILY MEDICINE

## 2021-01-07 PROCEDURE — 80069 RENAL FUNCTION PANEL: CPT | Performed by: FAMILY MEDICINE

## 2021-01-07 RX ORDER — CICLOPIROX OLAMINE 7.7 MG/G
CREAM TOPICAL
Qty: 90 G | Refills: 2 | Status: SHIPPED | OUTPATIENT
Start: 2021-01-07 | End: 2021-10-25

## 2021-01-07 ASSESSMENT — MIFFLIN-ST. JEOR: SCORE: 1657.83

## 2021-01-07 NOTE — LETTER
January 8, 2021      Codey Simon  [address] [place] [address]        Dear ,    Generally normal results except protein in urine slightly worse and cholesterol a little high. Recheck in 6 months   We should consider starting a blood pressure medication to protect your kidneys and lower your blood pressure. Drink more water and limit sodium and let me know if you are interested in starting medication. Blood sugars ok.    Resulted Orders   Hemoglobin A1c   Result Value Ref Range    Hemoglobin A1C 6.7 (H) 0 - 5.6 %      Comment:      Normal <5.7% Prediabetes 5.7-6.4%  Diabetes 6.5% or higher - adopted from ADA   consensus guidelines.     Renal panel (Alb, BUN, Ca, Cl, CO2, Creat, Gluc, Phos, K, Na)   Result Value Ref Range    Sodium 135 133 - 144 mmol/L    Potassium 3.6 3.4 - 5.3 mmol/L    Chloride 102 94 - 109 mmol/L    Carbon Dioxide 27 20 - 32 mmol/L    Anion Gap 6 3 - 14 mmol/L    Glucose 139 (H) 70 - 99 mg/dL      Comment:      Fasting specimen    Urea Nitrogen 13 7 - 30 mg/dL    Creatinine 0.81 0.66 - 1.25 mg/dL    GFR Estimate >90 >60 mL/min/[1.73_m2]      Comment:      Non  GFR Calc  Starting 12/18/2018, serum creatinine based estimated GFR (eGFR) will be   calculated using the Chronic Kidney Disease Epidemiology Collaboration   (CKD-EPI) equation.      GFR Estimate If Black >90 >60 mL/min/[1.73_m2]      Comment:       GFR Calc  Starting 12/18/2018, serum creatinine based estimated GFR (eGFR) will be   calculated using the Chronic Kidney Disease Epidemiology Collaboration   (CKD-EPI) equation.      Calcium 9.6 8.5 - 10.1 mg/dL    Phosphorus 3.1 2.5 - 4.5 mg/dL    Albumin 4.3 3.4 - 5.0 g/dL   Lipid Profile (Chol, Trig, HDL, LDL calc)   Result Value Ref Range    Cholesterol 229 (H) <200 mg/dL      Comment:      Desirable:       <200 mg/dl    Triglycerides 114 <150 mg/dL      Comment:      Fasting specimen    HDL Cholesterol 81 >39 mg/dL    LDL Cholesterol  Calculated 125 (H) <100 mg/dL      Comment:      Above desirable:  100-129 mg/dl  Borderline High:  130-159 mg/dL  High:             160-189 mg/dL  Very high:       >189 mg/dl      Non HDL Cholesterol 148 (H) <130 mg/dL      Comment:      Above Desirable:  130-159 mg/dl  Borderline high:  160-189 mg/dl  High:             190-219 mg/dl  Very high:       >219 mg/dl     Albumin Random Urine Quantitative with Creat Ratio   Result Value Ref Range    Creatinine Urine 79 mg/dL    Albumin Urine mg/L 24 mg/L    Albumin Urine mg/g Cr 30.79 (H) 0 - 17 mg/g Cr       If you have any questions or concerns, please call the clinic at the number listed above.       Sincerely,      Nadeem Nino MD/liseth

## 2021-01-07 NOTE — PROGRESS NOTES
"SUBJECTIVE:  Codey Simon, a 45 year old male scheduled an appointment to discuss the following issues:     Type 1 diabetes mellitus without complication (H)  CARDIOVASCULAR SCREENING; LDL GOAL LESS THAN 100   Follow-up dm1 and gerd.   Blood sugars - averages ok. No sugars <50 or LOSS OF CONSCIENCE. Not interested in changes in pump/monitor.  Some exercise.  No chest pain or shortness of breath. No acute feet changes.  Some athletes foot flares.  Emotionally doing ok.    Eye exam 2 years.  Last exam ok.   Medical, social, surgical, and family histories reviewed.    ROS:  All other ROS negative  OBJECTIVE:  /84   Pulse 105   Temp 98.4  F (36.9  C) (Tympanic)   Ht 1.727 m (5' 8\")   Wt 79.8 kg (176 lb)   SpO2 97%   BMI 26.76 kg/m   EXAM:  GENERAL APPEARANCE: healthy, alert and no distress  EYES: EOMI,  PERRL  HENT: ear canals and TM's normal and nose and mouth without ulcers or lesions  RESP: lungs clear to auscultation - no rales, rhonchi or wheezes  CV: regular rates and rhythm, normal S1 S2, no S3 or S4 and no murmur, click or rub -  ABDOMEN:  soft, nontender, no HSM or masses and bowel sounds normal  MS: extremities normal- no gross deformities noted, no evidence of inflammation in joints, FROM in all extremities.  PSYCH: mentation appears normal and affect normal/bright    ASSESSMENT / PLAN:  (E10.9) Type 1 diabetes mellitus without complication (H)  (primary encounter diagnosis)  Comment: stable  Plan: Hemoglobin A1c, Renal panel (Alb, BUN, Ca, Cl,         CO2, Creat, Gluc, Phos, K, Na), Albumin Random         Urine Quantitative with Creat Ratio        Continue insulin and closely monitor. Recheck in 6 months  Lisinopril if blood pressure worse but patient not interested at this point. Weight loss and exercise and limit sodium.     (Z13.6) CARDIOVASCULAR SCREENING; LDL GOAL LESS THAN 100  Plan: Lipid Profile (Chol, Trig, HDL, LDL calc)            (B35.3) Tinea pedis of both feet  Comment: " intermittent  Plan: ciclopirox (LOPROX) 0.77 % cream            Nadeem Nino MD

## 2021-01-07 NOTE — LETTER
January 8, 2021      Codey H Alfred  [address] [place] [address]        Dear ,    We are writing to inform you of your test results.    Generally normal results except cholesterol a little high. Kidneys and blood sugars ok. 5 lbs weight loss.    Resulted Orders   Hemoglobin A1c   Result Value Ref Range    Hemoglobin A1C 6.7 (H) 0 - 5.6 %      Comment:      Normal <5.7% Prediabetes 5.7-6.4%  Diabetes 6.5% or higher - adopted from ADA   consensus guidelines.     Renal panel (Alb, BUN, Ca, Cl, CO2, Creat, Gluc, Phos, K, Na)   Result Value Ref Range    Sodium 135 133 - 144 mmol/L    Potassium 3.6 3.4 - 5.3 mmol/L    Chloride 102 94 - 109 mmol/L    Carbon Dioxide 27 20 - 32 mmol/L    Anion Gap 6 3 - 14 mmol/L    Glucose 139 (H) 70 - 99 mg/dL      Comment:      Fasting specimen    Urea Nitrogen 13 7 - 30 mg/dL    Creatinine 0.81 0.66 - 1.25 mg/dL    GFR Estimate >90 >60 mL/min/[1.73_m2]      Comment:      Non  GFR Calc  Starting 12/18/2018, serum creatinine based estimated GFR (eGFR) will be   calculated using the Chronic Kidney Disease Epidemiology Collaboration   (CKD-EPI) equation.      GFR Estimate If Black >90 >60 mL/min/[1.73_m2]      Comment:       GFR Calc  Starting 12/18/2018, serum creatinine based estimated GFR (eGFR) will be   calculated using the Chronic Kidney Disease Epidemiology Collaboration   (CKD-EPI) equation.      Calcium 9.6 8.5 - 10.1 mg/dL    Phosphorus 3.1 2.5 - 4.5 mg/dL    Albumin 4.3 3.4 - 5.0 g/dL   Lipid Profile (Chol, Trig, HDL, LDL calc)   Result Value Ref Range    Cholesterol 229 (H) <200 mg/dL      Comment:      Desirable:       <200 mg/dl    Triglycerides 114 <150 mg/dL      Comment:      Fasting specimen    HDL Cholesterol 81 >39 mg/dL    LDL Cholesterol Calculated 125 (H) <100 mg/dL      Comment:      Above desirable:  100-129 mg/dl  Borderline High:  130-159 mg/dL  High:             160-189 mg/dL  Very high:       >189 mg/dl       Non HDL Cholesterol 148 (H) <130 mg/dL      Comment:      Above Desirable:  130-159 mg/dl  Borderline high:  160-189 mg/dl  High:             190-219 mg/dl  Very high:       >219 mg/dl         If you have any questions or concerns, please call the clinic at the number listed above.       Sincerely,      Nadeem Nino MD/liseth

## 2021-01-08 LAB
CREAT UR-MCNC: 79 MG/DL
MICROALBUMIN UR-MCNC: 24 MG/L
MICROALBUMIN/CREAT UR: 30.79 MG/G CR (ref 0–17)

## 2021-01-23 DIAGNOSIS — E10.9 TYPE 1 DIABETES MELLITUS WITHOUT COMPLICATION (H): ICD-10-CM

## 2021-01-25 RX ORDER — BLOOD SUGAR DIAGNOSTIC
STRIP MISCELLANEOUS
Qty: 300 STRIP | Refills: 3 | Status: SHIPPED | OUTPATIENT
Start: 2021-01-25 | End: 2021-06-21

## 2021-01-25 RX ORDER — INSULIN GLARGINE 100 [IU]/ML
INJECTION, SOLUTION SUBCUTANEOUS
Qty: 10 ML | Refills: 2 | Status: SHIPPED | OUTPATIENT
Start: 2021-01-25 | End: 2021-04-27

## 2021-01-25 RX ORDER — LANCETS
EACH MISCELLANEOUS
Qty: 306 EACH | Refills: 3 | Status: SHIPPED | OUTPATIENT
Start: 2021-01-25 | End: 2021-11-05

## 2021-02-26 DIAGNOSIS — E10.9 TYPE 1 DIABETES MELLITUS WITHOUT COMPLICATION (H): ICD-10-CM

## 2021-04-26 DIAGNOSIS — E10.9 TYPE 1 DIABETES MELLITUS WITHOUT COMPLICATION (H): ICD-10-CM

## 2021-04-27 RX ORDER — INSULIN GLARGINE 100 [IU]/ML
INJECTION, SOLUTION SUBCUTANEOUS
Qty: 10 ML | Refills: 2 | Status: SHIPPED | OUTPATIENT
Start: 2021-04-27 | End: 2021-09-12

## 2021-06-21 DIAGNOSIS — E10.9 TYPE 1 DIABETES MELLITUS WITHOUT COMPLICATION (H): ICD-10-CM

## 2021-06-21 RX ORDER — BLOOD SUGAR DIAGNOSTIC
STRIP MISCELLANEOUS
Qty: 300 STRIP | Refills: 0 | Status: SHIPPED | OUTPATIENT
Start: 2021-06-21 | End: 2021-07-28

## 2021-06-21 NOTE — TELEPHONE ENCOUNTER
Routing refill request to provider for review/approval because:  Labs not current:  hgb a1c  Cheyanne TOLENTINON, RN

## 2021-07-28 DIAGNOSIS — E10.9 TYPE 1 DIABETES MELLITUS WITHOUT COMPLICATION (H): ICD-10-CM

## 2021-07-28 RX ORDER — BLOOD SUGAR DIAGNOSTIC
STRIP MISCELLANEOUS
Qty: 300 STRIP | Refills: 0 | Status: SHIPPED | OUTPATIENT
Start: 2021-07-28 | End: 2021-09-12

## 2021-09-10 DIAGNOSIS — E10.9 TYPE 1 DIABETES MELLITUS WITHOUT COMPLICATION (H): ICD-10-CM

## 2021-09-12 RX ORDER — BLOOD SUGAR DIAGNOSTIC
STRIP MISCELLANEOUS
Qty: 300 STRIP | Refills: 0 | Status: SHIPPED | OUTPATIENT
Start: 2021-09-12 | End: 2021-11-04

## 2021-09-12 RX ORDER — INSULIN GLARGINE 100 [IU]/ML
INJECTION, SOLUTION SUBCUTANEOUS
Qty: 10 ML | Refills: 2 | Status: SHIPPED | OUTPATIENT
Start: 2021-09-12 | End: 2022-01-17

## 2021-09-13 ENCOUNTER — TELEPHONE (OUTPATIENT)
Dept: FAMILY MEDICINE | Facility: CLINIC | Age: 46
End: 2021-09-13

## 2021-09-13 DIAGNOSIS — E10.9 TYPE 1 DIABETES MELLITUS WITHOUT COMPLICATION (H): ICD-10-CM

## 2021-09-13 NOTE — TELEPHONE ENCOUNTER
Routing refill request to provider for review/approval because:  Failed protocol.  No future appointment scheduled. Please advise. Thank you. Yelena Lopez R.N.

## 2021-09-13 NOTE — TELEPHONE ENCOUNTER
Fax message: Pt requests refills for Humalog, Lantus, and Accu-Chek Guide Test strips. He would like long term refills. Please call pt if limited to 90 days total to explain reasoning.        In trying to call the patient to clarify if he is using the vial for his Humalog, the oerson at [phone] stated that this is not the correct number.  Thank you. Yelena Lopez R.N.

## 2021-09-13 NOTE — TELEPHONE ENCOUNTER
Fax message: Pt requests refills for Humalog, Lantus, and Accu-Chek Guide Test strips. He would like long term refills. Please call pt if limited to 90 days total to explain reasoning.          Lantus and test strips taken care of see above.  Humalog refill sent to the provider in a refill encounter to address. See that refill encounter.  Thank you. Yelena Lopez R.N.

## 2021-09-28 ENCOUNTER — TELEPHONE (OUTPATIENT)
Dept: FAMILY MEDICINE | Facility: CLINIC | Age: 46
End: 2021-09-28
Payer: COMMERCIAL

## 2021-09-28 NOTE — TELEPHONE ENCOUNTER
FYI:    Patient is calling asking questions regarding his refills.  He normally got 11 refills yearly,   Now He has been having issue's with this lately, stating that he is not getting his refills that are needed.  He would like to discuss this at his next appointment on 10/25/2021.    Zahra Kibmle

## 2021-10-25 ENCOUNTER — OFFICE VISIT (OUTPATIENT)
Dept: FAMILY MEDICINE | Facility: CLINIC | Age: 46
End: 2021-10-25
Payer: COMMERCIAL

## 2021-10-25 VITALS
SYSTOLIC BLOOD PRESSURE: 134 MMHG | TEMPERATURE: 97.4 F | OXYGEN SATURATION: 96 % | DIASTOLIC BLOOD PRESSURE: 84 MMHG | BODY MASS INDEX: 27.22 KG/M2 | WEIGHT: 179 LBS | HEART RATE: 102 BPM

## 2021-10-25 DIAGNOSIS — B35.3 TINEA PEDIS OF BOTH FEET: ICD-10-CM

## 2021-10-25 DIAGNOSIS — E10.9 TYPE 1 DIABETES MELLITUS WITHOUT COMPLICATION (H): Primary | ICD-10-CM

## 2021-10-25 LAB
ALBUMIN SERPL-MCNC: 4 G/DL (ref 3.4–5)
ANION GAP SERPL CALCULATED.3IONS-SCNC: 6 MMOL/L (ref 3–14)
BUN SERPL-MCNC: 11 MG/DL (ref 7–30)
CALCIUM SERPL-MCNC: 9.1 MG/DL (ref 8.5–10.1)
CHLORIDE BLD-SCNC: 105 MMOL/L (ref 94–109)
CHOLEST SERPL-MCNC: 242 MG/DL
CO2 SERPL-SCNC: 26 MMOL/L (ref 20–32)
CREAT SERPL-MCNC: 0.86 MG/DL (ref 0.66–1.25)
FASTING STATUS PATIENT QL REPORTED: YES
GFR SERPL CREATININE-BSD FRML MDRD: >90 ML/MIN/1.73M2
GLUCOSE BLD-MCNC: 202 MG/DL (ref 70–99)
HBA1C MFR BLD: 6.5 % (ref 0–5.6)
HDLC SERPL-MCNC: 76 MG/DL
LDLC SERPL CALC-MCNC: 142 MG/DL
NONHDLC SERPL-MCNC: 166 MG/DL
PHOSPHATE SERPL-MCNC: 1.4 MG/DL (ref 2.5–4.5)
POTASSIUM BLD-SCNC: 4 MMOL/L (ref 3.4–5.3)
SODIUM SERPL-SCNC: 137 MMOL/L (ref 133–144)
TRIGL SERPL-MCNC: 118 MG/DL

## 2021-10-25 PROCEDURE — 80061 LIPID PANEL: CPT | Performed by: FAMILY MEDICINE

## 2021-10-25 PROCEDURE — 36415 COLL VENOUS BLD VENIPUNCTURE: CPT | Performed by: FAMILY MEDICINE

## 2021-10-25 PROCEDURE — 99214 OFFICE O/P EST MOD 30 MIN: CPT | Performed by: FAMILY MEDICINE

## 2021-10-25 PROCEDURE — 80069 RENAL FUNCTION PANEL: CPT | Performed by: FAMILY MEDICINE

## 2021-10-25 PROCEDURE — 83036 HEMOGLOBIN GLYCOSYLATED A1C: CPT | Performed by: FAMILY MEDICINE

## 2021-10-25 RX ORDER — CICLOPIROX OLAMINE 7.7 MG/G
CREAM TOPICAL
Qty: 90 G | Refills: 2 | Status: SHIPPED | OUTPATIENT
Start: 2021-10-25 | End: 2023-02-13

## 2021-10-25 NOTE — LETTER
October 26, 2021      Codey MARTINEZ Simon  [address] [place] [address]        Dear ,    We are writing to inform you of your test results.    Generally normal results except high cholesterol. Consider starting lipitor to lower cholesterol prevent heart attack/stroke. Blood sugars and kidneys ok.      Resulted Orders   Hemoglobin A1c   Result Value Ref Range    Hemoglobin A1C 6.5 (H) 0.0 - 5.6 %      Comment:      Normal <5.7%   Prediabetes 5.7-6.4%    Diabetes 6.5% or higher     Note: Adopted from ADA consensus guidelines.   Lipid Profile   Result Value Ref Range    Cholesterol 242 (H) <200 mg/dL    Triglycerides 118 <150 mg/dL    Direct Measure HDL 76 >=40 mg/dL    LDL Cholesterol Calculated 142 (H) <=100 mg/dL    Non HDL Cholesterol 166 (H) <130 mg/dL    Patient Fasting > 8hrs? Yes     Narrative    Cholesterol  Desirable:  <200 mg/dL    Triglycerides  Normal:  Less than 150 mg/dL  Borderline High:  150-199 mg/dL  High:  200-499 mg/dL  Very High:  Greater than or equal to 500 mg/dL    Direct Measure HDL  Female:  Greater than or equal to 50 mg/dL   Male:  Greater than or equal to 40 mg/dL    LDL Cholesterol  Desirable:  <100mg/dL  Above Desirable:  100-129 mg/dL   Borderline High:  130-159 mg/dL   High:  160-189 mg/dL   Very High:  >= 190 mg/dL    Non HDL Cholesterol  Desirable:  130 mg/dL  Above Desirable:  130-159 mg/dL  Borderline High:  160-189 mg/dL  High:  190-219 mg/dL  Very High:  Greater than or equal to 220 mg/dL   Renal panel   Result Value Ref Range    Sodium 137 133 - 144 mmol/L    Potassium 4.0 3.4 - 5.3 mmol/L    Chloride 105 94 - 109 mmol/L    Carbon Dioxide (CO2) 26 20 - 32 mmol/L    Anion Gap 6 3 - 14 mmol/L    Urea Nitrogen 11 7 - 30 mg/dL    Creatinine 0.86 0.66 - 1.25 mg/dL    Calcium 9.1 8.5 - 10.1 mg/dL    Glucose 202 (H) 70 - 99 mg/dL    Albumin 4.0 3.4 - 5.0 g/dL    Phosphorus 1.4 (L) 2.5 - 4.5 mg/dL    GFR Estimate >90 >60 mL/min/1.73m2      Comment:      As of July 11,  2021, eGFR is calculated by the CKD-EPI creatinine equation, without race adjustment. eGFR can be influenced by muscle mass, exercise, and diet. The reported eGFR is an estimation only and is only applicable if the renal function is stable.       If you have any questions or concerns, please call the clinic at the number listed above.       Sincerely,      Nadeem Nino MD/liseth

## 2021-10-25 NOTE — PROGRESS NOTES
SUBJECTIVE:  Codey Simon, a 45 year old male scheduled an appointment to discuss the following issues:  Follow-up dm1, foot fungus and gerd.  Fungus stable. No numbness.   Blood sugars stable - not LOSS OF CONSCIENCE or <40. Overdue for eye exam.  No nausea, vomiting or diarrhea or black/bloody stools. No  Chest pain or shortness of breath. gerd - stable. Caffeine not a lot.    oiutside blood pressure needs.    Medical, social, surgical, and family histories reviewed.    ROS:  All other ROS negative.   OBJECTIVE:  /84   Pulse 102   Temp 97.4  F (36.3  C) (Tympanic)   Wt 81.2 kg (179 lb)   SpO2 96%   BMI 27.22 kg/m    EXAM:  GENERAL APPEARANCE: healthy, alert and no distress  EYES: EOMI,  PERRL  NECK: no adenopathy, no asymmetry, masses, or scars and thyroid normal to palpation  RESP: lungs clear to auscultation - no rales, rhonchi or wheezes  CV: regular rates and rhythm, normal S1 S2, no S3 or S4 and no murmur, click or rub -  ABDOMEN:  soft, nontender, no HSM or masses and bowel sounds normal  MS: extremities normal- no gross deformities noted, no evidence of inflammation in joints, FROM in all extremities.  SKIN: no suspicious lesions or rashes  NEURO: Normal strength and tone, sensory exam grossly normal, mentation intact and speech normal  PSYCH: mentation appears normal and affect normal/bright    ASSESSMENT / PLAN:  (E10.9) Type 1 diabetes mellitus without complication (H)  (primary encounter diagnosis)  Comment: stable  Plan: Hemoglobin A1c, Lipid Profile, Renal panel        Continue insulin/closely monitor. Patient NOT interested in lisinopril or ACE inhib or asa. Follow-up eye exam. Recheck in 6 months  Sooner if worse.     (B35.3) Tinea pedis of both feet  Comment: stable  Plan: ciclopirox (LOPROX) 0.77 % cream        Prn.     Nadeem Nino MD

## 2021-11-04 DIAGNOSIS — E10.9 TYPE 1 DIABETES MELLITUS WITHOUT COMPLICATION (H): ICD-10-CM

## 2021-11-04 RX ORDER — BLOOD SUGAR DIAGNOSTIC
STRIP MISCELLANEOUS
Qty: 300 STRIP | Refills: 3 | Status: SHIPPED | OUTPATIENT
Start: 2021-11-04 | End: 2021-11-05

## 2021-11-05 ENCOUNTER — TELEPHONE (OUTPATIENT)
Dept: FAMILY MEDICINE | Facility: CLINIC | Age: 46
End: 2021-11-05

## 2021-11-05 DIAGNOSIS — E10.9 TYPE 1 DIABETES MELLITUS WITHOUT COMPLICATION (H): ICD-10-CM

## 2021-11-05 RX ORDER — BLOOD SUGAR DIAGNOSTIC
STRIP MISCELLANEOUS
Qty: 900 STRIP | Refills: 11 | Status: SHIPPED | OUTPATIENT
Start: 2021-11-05 | End: 2022-11-09

## 2021-11-05 RX ORDER — LANCETS
EACH MISCELLANEOUS
Qty: 918 EACH | Refills: 11 | Status: SHIPPED | OUTPATIENT
Start: 2021-11-05 | End: 2022-11-09

## 2021-11-05 NOTE — TELEPHONE ENCOUNTER
PCP ordered strips using 10 strips daily.  Is this correct?  If so, a 3-month supply will need to be ordered for strips and lancets.   A1C last month was 6.5.    Routing to provider to advise.  Madison TOLENTINON, RN

## 2021-11-11 NOTE — TELEPHONE ENCOUNTER
Refills of Humalog have been sent and Novolog taken off of med list.     Mabel Benitez RN   [facility]      intermittent

## 2021-12-13 ENCOUNTER — TELEPHONE (OUTPATIENT)
Dept: FAMILY MEDICINE | Facility: CLINIC | Age: 46
End: 2021-12-13
Payer: COMMERCIAL

## 2021-12-13 NOTE — TELEPHONE ENCOUNTER
Fax message: Determining if it is appropriate to start a statin therapy. Please send a new presc for statin therapy if appropriate.

## 2021-12-13 NOTE — TELEPHONE ENCOUNTER
Called [facility] to discuss this message on the Fax.    [facility] pharmacy tech states that the patient is on diabetic medications so that triggers a prompt to ask about statin medications per Diabetic guidelines.  They are just requesting a review if a Statin is appropriate     Routing to PCP to please review.    Mariam Mckeon RN, [facility]

## 2022-01-15 DIAGNOSIS — E10.9 TYPE 1 DIABETES MELLITUS WITHOUT COMPLICATION (H): ICD-10-CM

## 2022-01-15 NOTE — LETTER
January 17, 2022    Codey Simon  [address] [place] [address]    Dear Codey,       We recently received a refill request for insulin glargine.  We have refilled this for one time  only because you are due for a:    Diabetes office visit      Please call at your earliest convenience so that there will not be a delay with your future refills.          Thank you,   Your [facility] Team/  [phone]

## 2022-01-17 RX ORDER — INSULIN GLARGINE 100 [IU]/ML
INJECTION, SOLUTION SUBCUTANEOUS
Qty: 10 ML | Refills: 0 | Status: SHIPPED | OUTPATIENT
Start: 2022-01-17 | End: 2022-02-02

## 2022-01-17 NOTE — TELEPHONE ENCOUNTER
Prescription approved per [facility] Refill Protocol.  APPT NEEDED FOR FURTHER REFILLS  Enedelia refill given per RN protocol.   Due for office visit  Pharmacy note to inform pt of office visit needed for continued medication use    Please send letter to advise patient for appointment with labs.   Courtney Cotter RN

## 2022-02-02 ENCOUNTER — TELEPHONE (OUTPATIENT)
Dept: FAMILY MEDICINE | Facility: CLINIC | Age: 47
End: 2022-02-02

## 2022-02-02 NOTE — TELEPHONE ENCOUNTER
"Dr. Trung TRANI.  If you want to tell the patient when he needs to be seen it will need to be a letter mailed to him.  Thank you. Yelena John SPAULDING.    Patient reports that he does not have a return number and has not for 5 years. He states that he received a letter stating he was given a dwaine refill and he needs to be seen.  He feels that he doesn't need to be seen. He states that \"I do not think you have the right to deny the insulin, matter of fact I know you don't have a right to deny my insulin\".  He stated that \"how convenient that he went on vacation and sent a letter then\".    He states \"I was dx with diabetes since 87\" and have seen him once each year and has been controlled.  He also stated that, so the patient doesn't have any say in there care anymore.  I explained to him he does have a say on when he is seen and has the freedom to make an appointment or not, but Dr. Nino takes the liability for his care and has standards of care that need to be applied to mange his disease.  We had a conversation that went around in circles and was not being productive.  I asked him multiple time how I could support him with his needs today and offered to send a message to Dr. Nino to clarify exactly when he needs to be seen when he returns.  He states that someone or myself should be able to answer this for him.  I informed him that Dr. Nino makes the recommendations for his patient based on the best care and if there is a discrepancy in what timeframe they need to be seen in that would need to come from Dr. Nino. He again stated that same things previous stated the above.  I informed him that I will give him 2 minutes to communicate what it is I can support him with after that I will end the call if not being productive. He stated great I have a full battery.  We sat on the phone for 1 minute and 20 second in silence.  I asked him if he would like to send a note to Dr. Nino asking for clarification.  He stated he would " call his pharmacy and request his insulin.  I informed him that I was ending the call as it was no longer productive.  Call was ended.  Thank you. Yelena Lopez R.N.    To provider as PEDRO LUIS.

## 2022-02-28 DIAGNOSIS — E10.9 TYPE 1 DIABETES MELLITUS WITHOUT COMPLICATION (H): ICD-10-CM

## 2022-02-28 RX ORDER — BLOOD SUGAR DIAGNOSTIC
STRIP MISCELLANEOUS
Qty: 400 EACH | Refills: 0 | Status: SHIPPED | OUTPATIENT
Start: 2022-02-28

## 2022-04-06 DIAGNOSIS — E10.9 TYPE 1 DIABETES MELLITUS WITHOUT COMPLICATION (H): ICD-10-CM

## 2022-04-06 RX ORDER — INSULIN GLARGINE 100 [IU]/ML
INJECTION, SOLUTION SUBCUTANEOUS
Qty: 10 ML | Refills: 0 | Status: SHIPPED | OUTPATIENT
Start: 2022-04-06 | End: 2022-05-09

## 2022-04-26 ENCOUNTER — TELEPHONE (OUTPATIENT)
Dept: FAMILY MEDICINE | Facility: CLINIC | Age: 47
End: 2022-04-26

## 2022-04-26 DIAGNOSIS — E10.9 TYPE 1 DIABETES MELLITUS WITHOUT COMPLICATION (H): Primary | ICD-10-CM

## 2022-04-26 NOTE — TELEPHONE ENCOUNTER
Patient is calling on this again. He said his meter is not working right. He needs a new one sent to his pharmacy.Carolina Ward MA/MARIELLA

## 2022-04-26 NOTE — TELEPHONE ENCOUNTER
Pharmacy comments: Patient is requesting a new Glucometer, Accu Check guide machine please. Current one is not testing correctly.

## 2022-05-06 DIAGNOSIS — E10.9 TYPE 1 DIABETES MELLITUS WITHOUT COMPLICATION (H): ICD-10-CM

## 2022-05-09 RX ORDER — INSULIN GLARGINE 100 [IU]/ML
INJECTION, SOLUTION SUBCUTANEOUS
Qty: 10 ML | Refills: 0 | Status: SHIPPED | OUTPATIENT
Start: 2022-05-09 | End: 2022-06-17

## 2022-05-09 RX ORDER — INSULIN LISPRO 100 [IU]/ML
INJECTION, SOLUTION INTRAVENOUS; SUBCUTANEOUS
Qty: 20 ML | Refills: 1 | Status: SHIPPED | OUTPATIENT
Start: 2022-05-09 | End: 2022-08-07

## 2022-05-09 NOTE — TELEPHONE ENCOUNTER
"Requested Prescriptions   Pending Prescriptions Disp Refills    LANTUS VIAL 100 UNIT/ML soln [Pharmacy Med Name: LANTUS 100 UNIT/ML VIAL] 10 mL 0     Sig: INJECT SUBCUTANEOUSLY 10 TO 15 UNITS TWICE DAILY        Long Acting Insulin Protocol Failed - 5/6/2022  7:26 PM        Failed - HgbA1C in past 3 or 6 months     If HgbA1C is 8 or greater, it needs to be on file within the past 3 months.  If less than 8, must be on file within the past 6 months.     Recent Labs   Lab Test 10/25/21  1418   A1C 6.5*             Failed - Recent (6 mo) or future (30 days) visit within the authorizing provider's specialty     Patient had office visit in the last 6 months or has a visit in the next 30 days with authorizing provider or within the authorizing provider's specialty.  See \"Patient Info\" tab in inbasket, or \"Choose Columns\" in Meds & Orders section of the refill encounter.            Passed - Serum creatinine on file in past 12 months     Recent Labs   Lab Test 10/25/21  1418   CR 0.86       Ok to refill medication if creatinine is low          Passed - Medication is active on med list        Passed - Patient is age 18 or older           HUMALOG 100 UNIT/ML injection [Pharmacy Med Name: HUMALOG 100 UNIT/ML VIAL] 20 mL      Sig: INJECT 65 UNITS SUBCUTANEOUSLY DAILY ON A SLIDING SCALE AS DIRECTED        Short Acting Insulin Protocol Failed - 5/6/2022  7:26 PM        Failed - HgbA1C in past 3 or 6 months     If HgbA1C is 8 or greater, it needs to be on file within the past 3 months.  If less than 8, must be on file within the past 6 months.     Recent Labs   Lab Test 10/25/21  1418   A1C 6.5*             Failed - Recent (6 mo) or future (30 days) visit within the authorizing provider's specialty     Patient had office visit in the last 6 months or has a visit in the next 30 days with authorizing provider or within the authorizing provider's specialty.  See \"Patient Info\" tab in inbasket, or \"Choose Columns\" in Meds & Orders " section of the refill encounter.            Passed - Serum creatinine on file in past 12 months     Recent Labs   Lab Test 10/25/21  1418   CR 0.86       Ok to refill medication if creatinine is low          Passed - Medication is active on med list        Passed - Patient is age 18 or older

## 2022-06-17 DIAGNOSIS — E10.9 TYPE 1 DIABETES MELLITUS WITHOUT COMPLICATION (H): ICD-10-CM

## 2022-06-20 NOTE — TELEPHONE ENCOUNTER
"Routing refill request to provider for review/approval because:  Requested Prescriptions   Pending Prescriptions Disp Refills    insulin glargine (LANTUS VIAL) 100 UNIT/ML vial 10 mL 0     Sig: INJECT SUBCUTANEOUSLY 10 TO 15 UNITS TWICE DAILY        Long Acting Insulin Protocol Failed - 6/17/2022  9:23 AM        Failed - HgbA1C in past 3 or 6 months     If HgbA1C is 8 or greater, it needs to be on file within the past 3 months.  If less than 8, must be on file within the past 6 months.     Recent Labs   Lab Test 10/25/21  1418   A1C 6.5*             Failed - Recent (6 mo) or future (30 days) visit within the authorizing provider's specialty     Patient had office visit in the last 6 months or has a visit in the next 30 days with authorizing provider or within the authorizing provider's specialty.  See \"Patient Info\" tab in inbasket, or \"Choose Columns\" in Meds & Orders section of the refill encounter.            Passed - Serum creatinine on file in past 12 months     Recent Labs   Lab Test 10/25/21  1418   CR 0.86       Ok to refill medication if creatinine is low          Passed - Medication is active on med list        Passed - Patient is age 18 or older                Ruma ASH, RN        "

## 2022-06-21 RX ORDER — INSULIN GLARGINE 100 [IU]/ML
INJECTION, SOLUTION SUBCUTANEOUS
Qty: 10 ML | Refills: 0 | Status: SHIPPED | OUTPATIENT
Start: 2022-06-21 | End: 2022-08-07

## 2022-09-30 DIAGNOSIS — E10.9 TYPE 1 DIABETES MELLITUS WITHOUT COMPLICATION (H): ICD-10-CM

## 2022-09-30 NOTE — LETTER
October 4, 2022    Codey Simon  [address] [place] [address]    Dear Codey,       We recently received a refill request for insulin glargine .  We have refilled this for a one time 30 day supply only because you are due for a:    Diabetes office visit and fasting lab appointment      Please schedule an office visit with your provider and a lab appointment 4-5 days prior to the office visit.     Please call at your earliest convenience so that there will not be a delay with your future refills.          Thank you,   Your [facility] Team/  [phone]

## 2022-10-03 RX ORDER — INSULIN LISPRO 100 [IU]/ML
INJECTION, SOLUTION INTRAVENOUS; SUBCUTANEOUS
Qty: 20 ML | Refills: 0 | Status: SHIPPED | OUTPATIENT
Start: 2022-10-03 | End: 2022-11-09

## 2022-10-03 RX ORDER — INSULIN GLARGINE 100 [IU]/ML
INJECTION, SOLUTION SUBCUTANEOUS
Qty: 10 ML | Refills: 0 | Status: SHIPPED | OUTPATIENT
Start: 2022-10-03 | End: 2022-11-09

## 2022-11-08 DIAGNOSIS — E10.9 TYPE 1 DIABETES MELLITUS WITHOUT COMPLICATION (H): ICD-10-CM

## 2022-11-09 RX ORDER — BLOOD SUGAR DIAGNOSTIC
STRIP MISCELLANEOUS
Qty: 300 STRIP | Refills: 35 | Status: SHIPPED | OUTPATIENT
Start: 2022-11-09 | End: 2024-01-07

## 2022-11-09 RX ORDER — INSULIN LISPRO 100 [IU]/ML
INJECTION, SOLUTION INTRAVENOUS; SUBCUTANEOUS
Qty: 20 ML | Refills: 0 | Status: SHIPPED | OUTPATIENT
Start: 2022-11-09 | End: 2022-12-20

## 2022-11-09 RX ORDER — LANCETS
EACH MISCELLANEOUS
Qty: 306 EACH | Refills: 35 | Status: SHIPPED | OUTPATIENT
Start: 2022-11-09 | End: 2024-02-12

## 2022-11-09 RX ORDER — INSULIN GLARGINE 100 [IU]/ML
INJECTION, SOLUTION SUBCUTANEOUS
Qty: 10 ML | Refills: 0 | Status: SHIPPED | OUTPATIENT
Start: 2022-11-09 | End: 2022-12-20

## 2022-12-19 DIAGNOSIS — E10.9 TYPE 1 DIABETES MELLITUS WITHOUT COMPLICATION (H): ICD-10-CM

## 2022-12-20 RX ORDER — INSULIN GLARGINE 100 [IU]/ML
INJECTION, SOLUTION SUBCUTANEOUS
Qty: 10 ML | Refills: 0 | Status: SHIPPED | OUTPATIENT
Start: 2022-12-20 | End: 2023-02-09

## 2022-12-20 RX ORDER — INSULIN LISPRO 100 [IU]/ML
INJECTION, SOLUTION INTRAVENOUS; SUBCUTANEOUS
Qty: 20 ML | Refills: 0 | Status: SHIPPED | OUTPATIENT
Start: 2022-12-20 | End: 2023-02-09

## 2023-02-07 NOTE — PROGRESS NOTES
"  ASSESSMENT / PLAN:  (E10.9) Type 1 diabetes mellitus without complication (H)  (primary encounter diagnosis)  Comment: stable  Plan: HEMOGLOBIN A1C, BASIC METABOLIC PANEL, insulin         glargine (LANTUS VIAL) 100 UNIT/ML vial,         insulin lispro (HUMALOG) 100 UNIT/ML vial,         losartan (COZAAR) 25 MG tablet, Echocardiogram         Exercise Stress        Patient might be interested in cozaar if outside blood pressure high or with stress echo. Continue insulin/closely lmonitor and follow-up eye exam  Reveiwed risks and side effects of medication    (R07.89) Chest pressure  Comment: likely gerd  Plan: Echocardiogram Exercise Stress        Patient interested in stress test. # given to set-up. If worsening/prolonged chest pain or with shortness of breath to er. gerd meds prn. Expected course and warning signs reviewed. Call/email with questions/concerns         Subjective   Codey is a 47 year old presenting for the following health issues:  Follow-up dm1, foot fungus and gerd.  Fungus stable.    gerd - tums prn - doing ok. Not needing diclofenac.   MGM with cva - elderly.   Mom with possibe MI in 70s.   Dm since 10yo. No feet changes acutely.  Some chest pain at times. Occasionally shortness of breath.  Might be from gerd but occasionally wih activity. Emotionally doing ok. Has girlfriend.   Diabetes      HPI       Review of Systems     Objective    Physical Exam /76   Pulse 111   Temp 98.7  F (37.1  C) (Oral)   Resp 16   Ht 1.74 m (5' 8.5\")   Wt 79.3 kg (174 lb 12.8 oz)   SpO2 98%   BMI 26.19 kg/m     GENERAL: healthy, alert and no distress  EYES: Eyes grossly normal to inspection, PERRL and conjunctivae and sclerae normal  NECK: no adenopathy, no asymmetry, masses, or scars and thyroid normal to palpation  RESP: lungs clear to auscultation - no rales, rhonchi or wheezes  CV: regular rate and rhythm, normal S1 S2, no S3 or S4, no murmur, click or rub, no peripheral edema and peripheral pulses " strong  ABDOMEN: soft, nontender, no hepatosplenomegaly, no masses and bowel sounds normal  MS: no gross musculoskeletal defects noted, no edema  PSYCH: mentation appears normal, affect normal/bright

## 2023-02-09 ENCOUNTER — OFFICE VISIT (OUTPATIENT)
Dept: FAMILY MEDICINE | Facility: CLINIC | Age: 48
End: 2023-02-09
Payer: COMMERCIAL

## 2023-02-09 VITALS
RESPIRATION RATE: 16 BRPM | HEIGHT: 69 IN | TEMPERATURE: 98.7 F | HEART RATE: 111 BPM | SYSTOLIC BLOOD PRESSURE: 138 MMHG | DIASTOLIC BLOOD PRESSURE: 76 MMHG | BODY MASS INDEX: 25.89 KG/M2 | OXYGEN SATURATION: 98 % | WEIGHT: 174.8 LBS

## 2023-02-09 DIAGNOSIS — R07.89 CHEST PRESSURE: ICD-10-CM

## 2023-02-09 DIAGNOSIS — E10.9 TYPE 1 DIABETES MELLITUS WITHOUT COMPLICATION (H): Primary | ICD-10-CM

## 2023-02-09 LAB
ANION GAP SERPL CALCULATED.3IONS-SCNC: 6 MMOL/L (ref 3–14)
BUN SERPL-MCNC: 9 MG/DL (ref 7–30)
CALCIUM SERPL-MCNC: 9.4 MG/DL (ref 8.5–10.1)
CHLORIDE BLD-SCNC: 104 MMOL/L (ref 94–109)
CO2 SERPL-SCNC: 29 MMOL/L (ref 20–32)
CREAT SERPL-MCNC: 0.74 MG/DL (ref 0.66–1.25)
GFR SERPL CREATININE-BSD FRML MDRD: >90 ML/MIN/1.73M2
GLUCOSE BLD-MCNC: 249 MG/DL (ref 70–99)
HBA1C MFR BLD: 6.8 % (ref 0–5.6)
POTASSIUM BLD-SCNC: 4.2 MMOL/L (ref 3.4–5.3)
SODIUM SERPL-SCNC: 139 MMOL/L (ref 133–144)

## 2023-02-09 PROCEDURE — 83036 HEMOGLOBIN GLYCOSYLATED A1C: CPT | Performed by: FAMILY MEDICINE

## 2023-02-09 PROCEDURE — 36415 COLL VENOUS BLD VENIPUNCTURE: CPT | Performed by: FAMILY MEDICINE

## 2023-02-09 PROCEDURE — 80048 BASIC METABOLIC PNL TOTAL CA: CPT | Performed by: FAMILY MEDICINE

## 2023-02-09 PROCEDURE — 99214 OFFICE O/P EST MOD 30 MIN: CPT | Performed by: FAMILY MEDICINE

## 2023-02-09 RX ORDER — LOSARTAN POTASSIUM 25 MG/1
25 TABLET ORAL DAILY
Qty: 90 TABLET | Refills: 1 | COMMUNITY
Start: 2023-02-09 | End: 2024-03-18

## 2023-02-09 RX ORDER — INSULIN LISPRO 100 [IU]/ML
INJECTION, SOLUTION INTRAVENOUS; SUBCUTANEOUS
Qty: 20 ML | Refills: 3 | Status: SHIPPED | OUTPATIENT
Start: 2023-02-09 | End: 2023-06-15

## 2023-02-09 RX ORDER — INSULIN GLARGINE 100 [IU]/ML
INJECTION, SOLUTION SUBCUTANEOUS
Qty: 10 ML | Refills: 3 | Status: SHIPPED | OUTPATIENT
Start: 2023-02-09 | End: 2023-06-15

## 2023-02-09 ASSESSMENT — PAIN SCALES - GENERAL: PAINLEVEL: NO PAIN (0)

## 2023-02-09 NOTE — LETTER
February 10, 2023      Codey Simon  [address] [place] [address]        Dear ,    We are writing to inform you of your test results.    Your test results fall within the expected range(s) or remain unchanged from previous results.  Please continue with current treatment plan.    Nadeem Nino MD/    Resulted Orders   HEMOGLOBIN A1C   Result Value Ref Range    Hemoglobin A1C 6.8 (H) 0.0 - 5.6 %      Comment:      Normal <5.7%   Prediabetes 5.7-6.4%    Diabetes 6.5% or higher     Note: Adopted from ADA consensus guidelines.   BASIC METABOLIC PANEL   Result Value Ref Range    Sodium 139 133 - 144 mmol/L    Potassium 4.2 3.4 - 5.3 mmol/L    Chloride 104 94 - 109 mmol/L    Carbon Dioxide (CO2) 29 20 - 32 mmol/L    Anion Gap 6 3 - 14 mmol/L    Urea Nitrogen 9 7 - 30 mg/dL    Creatinine 0.74 0.66 - 1.25 mg/dL    Calcium 9.4 8.5 - 10.1 mg/dL    Glucose 249 (H) 70 - 99 mg/dL    GFR Estimate >90 >60 mL/min/1.73m2      Comment:      eGFR calculated using 2021 CKD-EPI equation.

## 2023-02-11 DIAGNOSIS — B35.3 TINEA PEDIS OF BOTH FEET: ICD-10-CM

## 2023-02-13 RX ORDER — CICLOPIROX OLAMINE 7.7 MG/G
CREAM TOPICAL
Qty: 90 G | Refills: 2 | Status: SHIPPED | OUTPATIENT
Start: 2023-02-13

## 2023-06-01 ENCOUNTER — TELEPHONE (OUTPATIENT)
Dept: FAMILY MEDICINE | Facility: CLINIC | Age: 48
End: 2023-06-01
Payer: COMMERCIAL

## 2023-06-01 NOTE — TELEPHONE ENCOUNTER
Patient Quality Outreach    Patient is due for the following:   Colon Cancer Screening  Physical Preventive Adult Physical      Topic Date Due     Hepatitis B Vaccine (1 of 3 - 3-dose series) Never done     COVID-19 Vaccine (1) Never done     Pneumococcal Vaccine (1 - PCV) Never done     Diptheria Tetanus Pertussis (DTAP/TDAP/TD) Vaccine (1 - Tdap) Never done       Next Steps:   Schedule a office visit for Colon cancer screen and an Adult Preventative    Type of outreach:    Sent letter.      Questions for provider review:    None     Constanza Jacobs

## 2023-06-01 NOTE — LETTER
June 1, 2023      Codey Simon  [address] [place] [address]    Your team at [facility] cares about your health. We have reviewed your chart and based on our findings; we are making the following recommendations to better manage your health.     You are in particular need of attention regarding the following:     Colon Cancer screen: Call or MyChart message your clinic to schedule a colonoscopy, schedule/ a FIT Test, or order a Cologuard test. If you are unsure what type of test you need, please call your clinic and speak to clinic staff.   PREVENTATIVE VISIT: Physical    If you have already completed these items, please contact the clinic via phone or   MyChart so your care team can review and update your records. Thank you for   choosing [facility] Clinics for your healthcare needs. For any questions,   concerns, or to schedule an appointment please contact our clinic.    Healthy Regards,      Your [facility] Care Team

## 2023-09-25 DIAGNOSIS — E10.9 TYPE 1 DIABETES MELLITUS WITHOUT COMPLICATION (H): ICD-10-CM

## 2023-09-25 NOTE — LETTER
September 26, 2023    Codey Simon  [address] [place] [address]    Dear Codey,       We recently received a refill request for insulin glargine .  We have refilled this for a one time 90 day supply only because you are due for a:    Diabetes office visit and fasting lab appointment-per Dr Nino, this is needed in early winter.      Please schedule an office visit with your provider and a lab appointment 4-5 days prior to the office visit.     Please call at your earliest convenience so that there will not be a delay with your future refills.          Thank you,   Your [facility] Team/  [phone]

## 2023-09-26 RX ORDER — INSULIN GLARGINE 100 [IU]/ML
INJECTION, SOLUTION SUBCUTANEOUS
Qty: 10 ML | Refills: 0 | Status: SHIPPED | OUTPATIENT
Start: 2023-09-26 | End: 2023-11-08

## 2023-09-26 RX ORDER — INSULIN LISPRO 100 [IU]/ML
INJECTION, SOLUTION INTRAVENOUS; SUBCUTANEOUS
Qty: 20 ML | Refills: 0 | Status: SHIPPED | OUTPATIENT
Start: 2023-09-26 | End: 2023-11-08

## 2023-11-07 DIAGNOSIS — E10.9 TYPE 1 DIABETES MELLITUS WITHOUT COMPLICATION (H): ICD-10-CM

## 2023-11-07 NOTE — LETTER
November 8, 2023    Codey Simon  [address] [place] [address]    Dear Codey,       We recently received a refill request for insulin lispro (HUMALOG) 100 UNIT/ML vial .  We have refilled this for a one time 90 day supply only because you are due for a:    Diabetes office visit and fasting lab appointment      Please schedule an office visit with your provider and a lab appointment 4-5 days prior to the office visit.     Please call at your earliest convenience so that there will not be a delay with your future refills.          Thank you,   Your [facility] Team/  [phone]

## 2023-11-08 RX ORDER — INSULIN GLARGINE 100 [IU]/ML
INJECTION, SOLUTION SUBCUTANEOUS
Qty: 10 ML | Refills: 0 | Status: SHIPPED | OUTPATIENT
Start: 2023-11-08 | End: 2024-01-07

## 2023-11-08 RX ORDER — INSULIN LISPRO 100 [IU]/ML
INJECTION, SOLUTION INTRAVENOUS; SUBCUTANEOUS
Qty: 20 ML | Refills: 0 | Status: SHIPPED | OUTPATIENT
Start: 2023-11-08 | End: 2024-01-07

## 2024-01-05 DIAGNOSIS — E10.9 TYPE 1 DIABETES MELLITUS WITHOUT COMPLICATION (H): ICD-10-CM

## 2024-01-05 NOTE — LETTER
January 8, 2024    Codey Simon  [address] [place] [address]    Dear Codey,       We recently received a refill request for insulin lispro (HUMALOG) 100 UNIT/ML vial .  We have refilled this for  one time because you are due for a:    Diabetes office visit and fasting lab appointment-due in the next couple of months per Dr Nino.      Please schedule an office visit with your provider and a lab appointment 4-5 days prior to the office visit.     Please call at your earliest convenience so that there will not be a delay with your future refills.          Thank you,   Your [facility] Team/  [phone]

## 2024-01-07 RX ORDER — BLOOD SUGAR DIAGNOSTIC
STRIP MISCELLANEOUS
Qty: 300 STRIP | Refills: 35 | Status: SHIPPED | OUTPATIENT
Start: 2024-01-07

## 2024-01-07 RX ORDER — INSULIN GLARGINE 100 [IU]/ML
INJECTION, SOLUTION SUBCUTANEOUS
Qty: 10 ML | Refills: 0 | Status: SHIPPED | OUTPATIENT
Start: 2024-01-07 | End: 2024-02-12

## 2024-01-07 RX ORDER — INSULIN LISPRO 100 [IU]/ML
INJECTION, SOLUTION INTRAVENOUS; SUBCUTANEOUS
Qty: 20 ML | Refills: 0 | Status: SHIPPED | OUTPATIENT
Start: 2024-01-07 | End: 2024-02-14

## 2024-02-12 DIAGNOSIS — E10.9 TYPE 1 DIABETES MELLITUS WITHOUT COMPLICATION (H): ICD-10-CM

## 2024-02-12 RX ORDER — INSULIN LISPRO 100 [IU]/ML
INJECTION, SOLUTION INTRAVENOUS; SUBCUTANEOUS
Qty: 20 ML | Refills: 0 | OUTPATIENT
Start: 2024-02-12

## 2024-02-12 RX ORDER — INSULIN GLARGINE 100 [IU]/ML
INJECTION, SOLUTION SUBCUTANEOUS
Qty: 10 ML | Refills: 0 | Status: SHIPPED | OUTPATIENT
Start: 2024-02-12 | End: 2024-02-23

## 2024-02-12 RX ORDER — BLOOD SUGAR DIAGNOSTIC
STRIP MISCELLANEOUS
Qty: 400 EACH | Refills: 0 | Status: SHIPPED | OUTPATIENT
Start: 2024-02-12

## 2024-02-12 RX ORDER — LANCETS
EACH MISCELLANEOUS
Qty: 306 EACH | Refills: 35 | Status: SHIPPED | OUTPATIENT
Start: 2024-02-12

## 2024-02-12 NOTE — TELEPHONE ENCOUNTER
Well refill short acting insulin once appointment set-up. Ok for video If previsit fasting labs. Nadeem Nino MD

## 2024-02-13 DIAGNOSIS — E10.9 TYPE 1 DIABETES MELLITUS WITHOUT COMPLICATION (H): ICD-10-CM

## 2024-02-13 NOTE — LETTER
February 14, 2024    Codey Simon  [address] [place] [address]    Dear Codey,       We recently received a refill request for insulin lispro (HUMALOG) 100 UNIT/ML vial .  We have refilled this for one time only because you are due for a:    Diabetes office visit and fasting lab appointment. Dr Nino said you could do a video visit, after the labs have been done.      Please schedule an office visit with your provider and a lab appointment 4-5 days prior to the office visit.     Please call at your earliest convenience so that there will not be a delay with your future refills.          Thank you,   Your [facility] Team/  [phone]

## 2024-02-13 NOTE — TELEPHONE ENCOUNTER
Left a message on patient's voicemail that a lab and an appointment with Dr Nino is needed.Carolina Ward [facility]

## 2024-02-14 RX ORDER — INSULIN LISPRO 100 [IU]/ML
INJECTION, SOLUTION INTRAVENOUS; SUBCUTANEOUS
Qty: 10 ML | Refills: 0 | Status: SHIPPED | OUTPATIENT
Start: 2024-02-14 | End: 2024-02-19

## 2024-02-15 NOTE — TELEPHONE ENCOUNTER
Tried calling patient. Sounded like someone answered the phone and then hung up.Carolina Ward [facility]

## 2024-02-19 ENCOUNTER — TELEPHONE (OUTPATIENT)
Dept: FAMILY MEDICINE | Facility: CLINIC | Age: 49
End: 2024-02-19
Payer: COMMERCIAL

## 2024-02-19 DIAGNOSIS — E10.9 TYPE 1 DIABETES MELLITUS WITHOUT COMPLICATION (H): ICD-10-CM

## 2024-02-19 RX ORDER — INSULIN LISPRO 100 [IU]/ML
INJECTION, SOLUTION INTRAVENOUS; SUBCUTANEOUS
Qty: 3 ML | Refills: 0 | Status: SHIPPED | OUTPATIENT
Start: 2024-02-19 | End: 2024-02-20

## 2024-02-19 NOTE — TELEPHONE ENCOUNTER
Left message on voicemail that a lab and appointment with Dr Nino, then he will refill medications.Carolina Ward [facility]

## 2024-02-19 NOTE — TELEPHONE ENCOUNTER
Left message on patient's voicemail to set up a lab and an appointment with Dr Nino, then he an send in the refill.Carolina Ward [facility]

## 2024-02-19 NOTE — TELEPHONE ENCOUNTER
"Pt calling on his medication.    States that he knows he needs to make a follow up appointment for refills but does not understand why the provider will only give half a supply of Humalog.  States that he is not going to make an appointment until he gets a full prescription.   States \"it is a life sustaining medication.\"    Pt wants 20mL of Humalog to be sent to pharmacy.    Dolores Maharaj RN  [facility]    "

## 2024-02-19 NOTE — TELEPHONE ENCOUNTER
Pharmacy is requesting 20 ml (2 vials for each fill) of Humalog 100 units/[place] Vial. Please send new RX.Carolina Ward [facility]

## 2024-02-20 ENCOUNTER — TELEPHONE (OUTPATIENT)
Dept: FAMILY MEDICINE | Facility: CLINIC | Age: 49
End: 2024-02-20
Payer: COMMERCIAL

## 2024-02-20 DIAGNOSIS — E10.9 TYPE 1 DIABETES MELLITUS WITHOUT COMPLICATION (H): ICD-10-CM

## 2024-02-20 RX ORDER — INSULIN LISPRO 100 [IU]/ML
INJECTION, SOLUTION INTRAVENOUS; SUBCUTANEOUS
Qty: 10 ML | Refills: 0 | Status: SHIPPED | OUTPATIENT
Start: 2024-02-20 | End: 2024-02-22

## 2024-02-20 NOTE — TELEPHONE ENCOUNTER
"To provider to advise- Humalog 10mL order sent to pharmacy today due to pt over due for appointment.  Pt calling requesting order be changed to 20mL. Pt states \"I have been getting 20mL for years, I don't know why we are changing it now\".  Pt again informed over due for appointment. Pt states he is not making an appointment until medication quantity increased.     RN contacted pharmacy and confirmed that this order is ready for .   Pharmacy staff stated pt refuses to  order until provider changes it to dispense 20mL.     Thank you - Maday Pineda, RANDAN, RN          "

## 2024-02-20 NOTE — TELEPHONE ENCOUNTER
Message from pharmacy in regards to-insulin lispro (HUMALOG) 100 UNIT/ML vial     Pharmacy said to send an RX for correct quantity, this medication comes in 10 ml per vial. Quantity on RX says 3 ML.Carolina Ward [facility]

## 2024-02-21 ENCOUNTER — TELEPHONE (OUTPATIENT)
Dept: FAMILY MEDICINE | Facility: CLINIC | Age: 49
End: 2024-02-21
Payer: COMMERCIAL

## 2024-02-21 DIAGNOSIS — E10.9 TYPE 1 DIABETES MELLITUS WITHOUT COMPLICATION (H): ICD-10-CM

## 2024-02-21 NOTE — TELEPHONE ENCOUNTER
Pt calling again about insulin. He said if he fills the 10 ml it will be only a 15 day supply and his insurance will not let him fill insulin again for 30 days.  I read provider message from last call yesterday to pt.  Pt said he has been seen in last year, advised last ov was 2/9/23.  Advised standard of care is every 6 months. Pt said it is malpractice to not give him the prescription. Advised he can see any provider for the insulin he just needs an appointment.  Pt states we are denying him medication. Advised again to make an appointment. He said he will make one for March. Advised he should be seen sooner. Advised he can see a different provider if he wants. Advised he can come into urgent care for refill to get to appointment if needed.  Pt said he will make appointment, pt transferred to scheduling to make appointment. Advised I would let Dr. Nino know and see if he is willing to do more insulin to get him to appointment.     Pt was threatening and loud and aggressive on phone.      Since pt made appointment would you consider doing the humalog refill as 20 ml to get to appointment.     Cheyanne TOLENTINON, RN

## 2024-02-21 NOTE — TELEPHONE ENCOUNTER
I'm sorry but our policy is that all diabetes be seen every 6 months. Patient not seen in over a year. This will be the last refill without appointment and will need to seek another provider if can't be seen at least once yearly per our protocol. This isn't just for diabetes, all patient need to be seen at least yearly for any medication refills. Nadeem Nino MD

## 2024-02-21 NOTE — TELEPHONE ENCOUNTER
Medication Question or Refill        What medication are you calling about (include dose and sig)?:     insulin lispro (HUMALOG) 100 UNIT/ML vial         Preferred Pharmacy:   [facility] [address] [facility] - [place], [place] - [address]  [address] [address]  Phone: [phone] Fax: [phone]      Controlled Substance Agreement on file:   CSA -- Patient Level:    CSA: None found at the patient level.       Who prescribed the medication?: Dr. Nino    Do you need a refill? Yes    When did you use the medication last? 02/21    Patient offered an appointment? Yes: Is set up for March 18th    Do you have any questions or concerns?  Yes: Needs 20 ml dosage asap, rationing until getting the right ammount      Okay to leave a detailed message?:

## 2024-02-22 ENCOUNTER — TELEPHONE (OUTPATIENT)
Dept: FAMILY MEDICINE | Facility: CLINIC | Age: 49
End: 2024-02-22
Payer: COMMERCIAL

## 2024-02-22 DIAGNOSIS — E10.9 TYPE 1 DIABETES MELLITUS WITHOUT COMPLICATION (H): ICD-10-CM

## 2024-02-22 RX ORDER — INSULIN LISPRO 100 [IU]/ML
INJECTION, SOLUTION INTRAVENOUS; SUBCUTANEOUS
Qty: 10 ML | Refills: 0 | OUTPATIENT
Start: 2024-02-22

## 2024-02-22 RX ORDER — INSULIN LISPRO 100 [IU]/ML
INJECTION, SOLUTION INTRAVENOUS; SUBCUTANEOUS
Qty: 60 ML | Refills: 0 | Status: SHIPPED | OUTPATIENT
Start: 2024-02-22 | End: 2024-04-22

## 2024-02-22 NOTE — TELEPHONE ENCOUNTER
Duplicate request. See multiple additional telephone encounters.  2/20/24 order is approx 15 days supply. Appointment is scheduled for 3/18/24.    Thank you - Maday Pineda, BSN, RN

## 2024-02-22 NOTE — TELEPHONE ENCOUNTER
Fax message: Please send prescription for 2 vials (20 ML), per patient request.     Drug:   insulin lispro (HUMALOG) 100 UNIT/ML vial

## 2024-02-23 RX ORDER — INSULIN GLARGINE 100 [IU]/ML
INJECTION, SOLUTION SUBCUTANEOUS
Qty: 60 ML | Refills: 0 | Status: SHIPPED | OUTPATIENT
Start: 2024-02-23 | End: 2024-09-18

## 2024-03-18 ENCOUNTER — OFFICE VISIT (OUTPATIENT)
Dept: FAMILY MEDICINE | Facility: CLINIC | Age: 49
End: 2024-03-18
Payer: COMMERCIAL

## 2024-03-18 VITALS
DIASTOLIC BLOOD PRESSURE: 86 MMHG | RESPIRATION RATE: 16 BRPM | TEMPERATURE: 98.2 F | WEIGHT: 177.4 LBS | OXYGEN SATURATION: 98 % | HEIGHT: 69 IN | SYSTOLIC BLOOD PRESSURE: 138 MMHG | BODY MASS INDEX: 26.28 KG/M2 | HEART RATE: 120 BPM

## 2024-03-18 DIAGNOSIS — B35.3 TINEA PEDIS OF BOTH FEET: ICD-10-CM

## 2024-03-18 DIAGNOSIS — E10.9 TYPE 1 DIABETES MELLITUS WITHOUT COMPLICATION (H): Primary | ICD-10-CM

## 2024-03-18 LAB — HBA1C MFR BLD: 6.6 % (ref 0–5.6)

## 2024-03-18 PROCEDURE — 80048 BASIC METABOLIC PNL TOTAL CA: CPT | Performed by: FAMILY MEDICINE

## 2024-03-18 PROCEDURE — 36415 COLL VENOUS BLD VENIPUNCTURE: CPT | Performed by: FAMILY MEDICINE

## 2024-03-18 PROCEDURE — 99214 OFFICE O/P EST MOD 30 MIN: CPT | Performed by: FAMILY MEDICINE

## 2024-03-18 PROCEDURE — 83036 HEMOGLOBIN GLYCOSYLATED A1C: CPT | Performed by: FAMILY MEDICINE

## 2024-03-18 RX ORDER — LOSARTAN POTASSIUM 25 MG/1
25 TABLET ORAL DAILY
COMMUNITY
Start: 2024-03-18

## 2024-03-18 ASSESSMENT — PAIN SCALES - GENERAL: PAINLEVEL: NO PAIN (0)

## 2024-03-18 NOTE — PROGRESS NOTES
"ASSESSMENT / PLAN:  (E10.9) Type 1 diabetes mellitus without complication (H)  (primary encounter diagnosis)  Comment: stalbe  Plan: HEMOGLOBIN A1C, BASIC METABOLIC PANEL, losartan        (COZAAR) 25 MG tablet, Albumin Random Urine         Quantitative with Creat Ratio, CANCELED:         Albumin Random Urine Quantitative with Creat         Ratio        Patient not interested in dm ed or statin or continous monitoring. Patient aware that he needs at least yearly exam/labs. If chest pain or shortness of breath to er. Advised to continue cozaar and if not then self-monitor blood pressure. Expected course and warning signs reviewed. Continue diet/exercise.     (B35.3) Tinea pedis of both feet  Comment: stable  Plan: prn antifungal.       Po Alegre is a 48 year old, presenting for the following health issues:  Diabetes  Follow-up dm1, foot fungus and gerd.   No more chest pain. Believes from gerd was on nsaids - improved.  No sugars <40 or LOSS OF CONSCIENCE.   No smart phone - not intersted in continuous monitoring.   No hematuria or dysuria.  Active at work. Home ok. Sleep overall ok.   No feet changes or numbness. Due for eye exam - cheaters.   No nausea, vomiting or diarrhea or black/bloody stools.   Not interested colonoscopy. No family history colon cancer.  Yearly exam in March.   Patient not interested in colon cancer screening      3/18/2024     2:49 PM   Additional Questions   Roomed by KATHY Jacobs CMA     HPI           Objective    /86   Pulse 120   Temp 98.2  F (36.8  C) (Oral)   Resp 16   Ht 1.74 m (5' 8.5\")   Wt 80.5 kg (177 lb 6.4 oz)   SpO2 98%   BMI 26.58 kg/m     Physical Exam   GENERAL: alert and no distress  EYES: Eyes grossly normal to inspection, PERRL and conjunctivae and sclerae normal  HENT: ear canals and TM's normal, nose and mouth without ulcers or lesions  NECK: no adenopathy, no asymmetry, masses, or scars  RESP: lungs clear to auscultation - no rales, rhonchi or " wheezes  CV: regular rate and rhythm, normal S1 S2, no S3 or S4, no murmur, click or rub, no peripheral edema   ABDOMEN: soft, nontender, no hepatosplenomegaly, no masses and bowel sounds normal  MS: no gross musculoskeletal defects noted, no edema  SKIN: no suspicious lesions or rashes  PSYCH: mentation appears normal, affect normal/bright            Signed Electronically by: Nadeem Nino MD

## 2024-03-18 NOTE — LETTER
March 19, 2024      Codey HENRIQUEZ Alfred  [address] [place] [address]      Dear ,    We are writing to inform you of your test results.  Your test results fall within the expected range(s) or remain unchanged from previous results.  Please continue with current treatment plan.    Resulted Orders   HEMOGLOBIN A1C   Result Value Ref Range    Hemoglobin A1C 6.6 (H) 0.0 - 5.6 %      Comment:      Normal <5.7%   Prediabetes 5.7-6.4%    Diabetes 6.5% or higher     Note: Adopted from ADA consensus guidelines.   BASIC METABOLIC PANEL   Result Value Ref Range    Sodium 138 135 - 145 mmol/L      Comment:      Reference intervals for this test were updated on 09/26/2023 to more accurately reflect our healthy population. There may be differences in the flagging of prior results with similar values performed with this method. Interpretation of those prior results can be made in the context of the updated reference intervals.     Potassium 4.9 3.4 - 5.3 mmol/L    Chloride 100 98 - 107 mmol/L    Carbon Dioxide (CO2) 24 22 - 29 mmol/L    Anion Gap 14 7 - 15 mmol/L    Urea Nitrogen 7.8 6.0 - 20.0 mg/dL    Creatinine 0.76 0.67 - 1.17 mg/dL    GFR Estimate >90 >60 mL/min/1.73m2    Calcium 9.9 8.6 - 10.0 mg/dL    Glucose 88 70 - 99 mg/dL   If you have any questions or concerns, please call the clinic at the number listed above.     Sincerely,      Nadeem Nino MD/bmc

## 2024-03-19 LAB
ANION GAP SERPL CALCULATED.3IONS-SCNC: 14 MMOL/L (ref 7–15)
BUN SERPL-MCNC: 7.8 MG/DL (ref 6–20)
CALCIUM SERPL-MCNC: 9.9 MG/DL (ref 8.6–10)
CHLORIDE SERPL-SCNC: 100 MMOL/L (ref 98–107)
CREAT SERPL-MCNC: 0.76 MG/DL (ref 0.67–1.17)
DEPRECATED HCO3 PLAS-SCNC: 24 MMOL/L (ref 22–29)
EGFRCR SERPLBLD CKD-EPI 2021: >90 ML/MIN/1.73M2
GLUCOSE SERPL-MCNC: 88 MG/DL (ref 70–99)
POTASSIUM SERPL-SCNC: 4.9 MMOL/L (ref 3.4–5.3)
SODIUM SERPL-SCNC: 138 MMOL/L (ref 135–145)

## 2024-04-08 ENCOUNTER — TELEPHONE (OUTPATIENT)
Dept: FAMILY MEDICINE | Facility: CLINIC | Age: 49
End: 2024-04-08
Payer: COMMERCIAL

## 2024-04-08 NOTE — LETTER
April 8, 2024      Codey Simon  [address] [place] [address]    Your team at [facility] cares about your health. We have reviewed your chart and based on our findings; we are making the following recommendations to better manage your health.     You are in particular need of attention regarding the following:     Schedule a colonoscopy, schedule/ a FIT Test, or order a Cologuard test. If you are unsure what type of test you need, please call your clinic and speak to clinic staff.   PREVENTATIVE VISIT: Physical    If you have already completed these items, please contact the clinic via phone or   [employer] so your care team can review and update your records. Thank you for   choosing [facility] for your healthcare needs. For any questions,   concerns, or to schedule an appointment please contact our clinic.    Healthy Regards,      Your [facility] Care Team

## 2024-04-08 NOTE — TELEPHONE ENCOUNTER
Patient Quality Outreach    Patient is due for the following:   Diabetes -  Eye Exam, Microalbumin, and Foot Exam  Colon Cancer Screening  Physical Preventive Adult Physical      Topic Date Due    Pneumococcal Vaccine (1 of 2 - PCV) Never done    Hepatitis B Vaccine (1 of 3 - 19+ 3-dose series) Never done    Diptheria Tetanus Pertussis (DTAP/TDAP/TD) Vaccine (1 - Tdap) Never done    Flu Vaccine (1) Never done    COVID-19 Vaccine (1 - 2023-24 season) Never done       Next Steps:   Schedule a Adult Preventative, colon cancer screen, immunizations    Type of outreach:    Sent letter.      Questions for provider review:    None           Constanza Jacobs

## 2024-04-19 DIAGNOSIS — E10.9 TYPE 1 DIABETES MELLITUS WITHOUT COMPLICATION (H): ICD-10-CM

## 2024-04-22 DIAGNOSIS — E10.9 TYPE 1 DIABETES MELLITUS WITHOUT COMPLICATION (H): ICD-10-CM

## 2024-04-22 RX ORDER — BLOOD-GLUCOSE METER
EACH MISCELLANEOUS
Qty: 1 KIT | Refills: 0 | Status: SHIPPED | OUTPATIENT
Start: 2024-04-22

## 2024-04-22 RX ORDER — INSULIN LISPRO 100 [IU]/ML
INJECTION, SOLUTION INTRAVENOUS; SUBCUTANEOUS
Qty: 60 ML | Refills: 3 | Status: SHIPPED | OUTPATIENT
Start: 2024-04-22

## 2024-04-22 NOTE — TELEPHONE ENCOUNTER
Fax message: Pt is requesting proactive refill for Humalog vial, inject 65U subcutaneous daily on a sliding scale, thanks!

## 2024-09-17 DIAGNOSIS — E10.9 TYPE 1 DIABETES MELLITUS WITHOUT COMPLICATION (H): ICD-10-CM

## 2024-09-18 RX ORDER — INSULIN GLARGINE 100 [IU]/ML
INJECTION, SOLUTION SUBCUTANEOUS
Qty: 10 ML | Refills: 5 | Status: SHIPPED | OUTPATIENT
Start: 2024-09-18

## 2024-10-22 DIAGNOSIS — E10.9 TYPE 1 DIABETES MELLITUS WITHOUT COMPLICATION (H): ICD-10-CM

## 2024-10-23 RX ORDER — BLOOD-GLUCOSE METER
EACH MISCELLANEOUS
Qty: 1 KIT | Refills: 0 | Status: SHIPPED | OUTPATIENT
Start: 2024-10-23

## 2025-01-29 DIAGNOSIS — E10.9 TYPE 1 DIABETES MELLITUS WITHOUT COMPLICATION (H): ICD-10-CM

## 2025-01-30 RX ORDER — BLOOD SUGAR DIAGNOSTIC
STRIP MISCELLANEOUS
Qty: 300 STRIP | Refills: 35 | Status: SHIPPED | OUTPATIENT
Start: 2025-01-30

## 2025-03-19 DIAGNOSIS — E10.9 TYPE 1 DIABETES MELLITUS WITHOUT COMPLICATION (H): ICD-10-CM

## 2025-03-20 RX ORDER — INSULIN GLARGINE 100 [IU]/ML
INJECTION, SOLUTION SUBCUTANEOUS
Qty: 15 ML | Refills: 0 | Status: SHIPPED | OUTPATIENT
Start: 2025-03-20

## 2025-04-07 ENCOUNTER — OFFICE VISIT (OUTPATIENT)
Dept: FAMILY MEDICINE | Facility: CLINIC | Age: 50
End: 2025-04-07
Payer: COMMERCIAL

## 2025-04-07 VITALS
WEIGHT: 163 LBS | OXYGEN SATURATION: 96 % | HEART RATE: 115 BPM | SYSTOLIC BLOOD PRESSURE: 137 MMHG | DIASTOLIC BLOOD PRESSURE: 87 MMHG | BODY MASS INDEX: 24.14 KG/M2 | TEMPERATURE: 98.7 F | HEIGHT: 69 IN | RESPIRATION RATE: 16 BRPM

## 2025-04-07 DIAGNOSIS — B35.3 TINEA PEDIS OF BOTH FEET: ICD-10-CM

## 2025-04-07 DIAGNOSIS — E10.9 TYPE 1 DIABETES MELLITUS WITHOUT COMPLICATION (H): Primary | ICD-10-CM

## 2025-04-07 LAB
ANION GAP SERPL CALCULATED.3IONS-SCNC: 15 MMOL/L (ref 7–15)
BUN SERPL-MCNC: 9.1 MG/DL (ref 6–20)
CALCIUM SERPL-MCNC: 9.3 MG/DL (ref 8.8–10.4)
CHLORIDE SERPL-SCNC: 103 MMOL/L (ref 98–107)
CHOLEST SERPL-MCNC: 209 MG/DL
CREAT SERPL-MCNC: 0.65 MG/DL (ref 0.67–1.17)
EGFRCR SERPLBLD CKD-EPI 2021: >90 ML/MIN/1.73M2
EST. AVERAGE GLUCOSE BLD GHB EST-MCNC: 160 MG/DL
FASTING STATUS PATIENT QL REPORTED: NO
FASTING STATUS PATIENT QL REPORTED: NO
GLUCOSE SERPL-MCNC: 210 MG/DL (ref 70–99)
HBA1C MFR BLD: 7.2 % (ref 0–5.6)
HCO3 SERPL-SCNC: 23 MMOL/L (ref 22–29)
HDLC SERPL-MCNC: 106 MG/DL
LDLC SERPL CALC-MCNC: 87 MG/DL
NONHDLC SERPL-MCNC: 103 MG/DL
POTASSIUM SERPL-SCNC: 3.9 MMOL/L (ref 3.4–5.3)
SODIUM SERPL-SCNC: 141 MMOL/L (ref 135–145)
TRIGL SERPL-MCNC: 81 MG/DL

## 2025-04-07 PROCEDURE — 80061 LIPID PANEL: CPT | Performed by: FAMILY MEDICINE

## 2025-04-07 PROCEDURE — 80048 BASIC METABOLIC PNL TOTAL CA: CPT | Performed by: FAMILY MEDICINE

## 2025-04-07 PROCEDURE — 83036 HEMOGLOBIN GLYCOSYLATED A1C: CPT | Performed by: FAMILY MEDICINE

## 2025-04-07 PROCEDURE — 36415 COLL VENOUS BLD VENIPUNCTURE: CPT | Performed by: FAMILY MEDICINE

## 2025-04-07 PROCEDURE — 3079F DIAST BP 80-89 MM HG: CPT | Performed by: FAMILY MEDICINE

## 2025-04-07 PROCEDURE — 3075F SYST BP GE 130 - 139MM HG: CPT | Performed by: FAMILY MEDICINE

## 2025-04-07 PROCEDURE — 1126F AMNT PAIN NOTED NONE PRSNT: CPT | Performed by: FAMILY MEDICINE

## 2025-04-07 PROCEDURE — 99214 OFFICE O/P EST MOD 30 MIN: CPT | Performed by: FAMILY MEDICINE

## 2025-04-07 RX ORDER — INSULIN LISPRO 100 [IU]/ML
INJECTION, SOLUTION INTRAVENOUS; SUBCUTANEOUS
Qty: 60 ML | Refills: 11 | Status: SHIPPED | OUTPATIENT
Start: 2025-04-07

## 2025-04-07 RX ORDER — INSULIN GLARGINE 100 [IU]/ML
INJECTION, SOLUTION SUBCUTANEOUS
Qty: 15 ML | Refills: 11 | Status: SHIPPED | OUTPATIENT
Start: 2025-04-07

## 2025-04-07 RX ORDER — LANCETS
EACH MISCELLANEOUS
Qty: 306 EACH | Refills: 35 | Status: SHIPPED | OUTPATIENT
Start: 2025-04-07

## 2025-04-07 RX ORDER — BLOOD SUGAR DIAGNOSTIC
STRIP MISCELLANEOUS
Qty: 900 STRIP | Refills: 35 | Status: SHIPPED | OUTPATIENT
Start: 2025-04-07

## 2025-04-07 RX ORDER — LOSARTAN POTASSIUM 25 MG/1
25 TABLET ORAL DAILY
Qty: 90 TABLET | Refills: 3 | Status: SHIPPED | OUTPATIENT
Start: 2025-04-07

## 2025-04-07 RX ORDER — CICLOPIROX OLAMINE 7.7 MG/G
CREAM TOPICAL
Qty: 90 G | Refills: 2 | Status: SHIPPED | OUTPATIENT
Start: 2025-04-07

## 2025-04-07 ASSESSMENT — PAIN SCALES - GENERAL: PAINLEVEL_OUTOF10: NO PAIN (0)

## 2025-04-07 NOTE — LETTER
April 8, 2025      Codey MARTINEZ Simon  [address] [place] [address]        Dear ,    We are writing to inform you of your test results.    Normal results.  Blood sugars at goal and kidneys/cholesterol ok.    Resulted Orders   Lipid panel reflex to direct LDL Non-fasting   Result Value Ref Range    Cholesterol 209 (H) <200 mg/dL    Triglycerides 81 <150 mg/dL    Direct Measure  >=40 mg/dL    LDL Cholesterol Calculated 87 <100 mg/dL    Non HDL Cholesterol 103 <130 mg/dL    Patient Fasting > 8hrs? No     Narrative    Cholesterol  Desirable: < 200 mg/dL  Borderline High: 200 - 239 mg/dL  High: >= 240 mg/dL    Triglycerides  Normal: < 150 mg/dL  Borderline High: 150 - 199 mg/dL  High: 200-499 mg/dL  Very High: >= 500 mg/dL    Direct Measure HDL  Female: >= 50 mg/dL   Male: >= 40 mg/dL    LDL Cholesterol  Desirable: < 100 mg/dL  Above Desirable: 100 - 129 mg/dL   Borderline High: 130 - 159 mg/dL   High:  160 - 189 mg/dL   Very High: >= 190 mg/dL    Non HDL Cholesterol  Desirable: < 130 mg/dL  Above Desirable: 130 - 159 mg/dL  Borderline High: 160 - 189 mg/dL  High: 190 - 219 mg/dL  Very High: >= 220 mg/dL   HEMOGLOBIN A1C   Result Value Ref Range    Estimated Average Glucose 160 (H) <117 mg/dL    Hemoglobin A1C 7.2 (H) 0.0 - 5.6 %      Comment:      Normal <5.7%   Prediabetes 5.7-6.4%    Diabetes 6.5% or higher     Note: Adopted from ADA consensus guidelines.   BASIC METABOLIC PANEL   Result Value Ref Range    Sodium 141 135 - 145 mmol/L    Potassium 3.9 3.4 - 5.3 mmol/L    Chloride 103 98 - 107 mmol/L    Carbon Dioxide (CO2) 23 22 - 29 mmol/L    Anion Gap 15 7 - 15 mmol/L    Urea Nitrogen 9.1 6.0 - 20.0 mg/dL    Creatinine 0.65 (L) 0.67 - 1.17 mg/dL    GFR Estimate >90 >60 mL/min/1.73m2      Comment:      eGFR calculated using 2021 CKD-EPI equation.    Calcium 9.3 8.8 - 10.4 mg/dL    Glucose 210 (H) 70 - 99 mg/dL    Patient Fasting > 8hrs? No      If you have any questions or concerns, please call  the clinic at the number listed above.     Sincerely,        Nadeem Nino MD/bmc    Electronically signed

## 2025-04-07 NOTE — PROGRESS NOTES
Preventive Care Visit  [facility]  Nadeem Nino MD, Family Medicine  Apr 7, 2025  {Provider  Link to SmartSet :262593}    {PROVIDER CHARTING PREFERENCE:070582}    Po Alegre is a 49 year old, presenting for the following:  Diabetes        4/7/2025     2:56 PM   Additional Questions   Roomed by Naomy   Accompanied by self         4/7/2025     2:56 PM   Patient Reported Additional Medications   Patient reports taking the following new medications n/a          HPI  ***   {MA/LPN/RN Pre-Provider Visit Orders- hCG/UA/Strep (Optional):066443}  {SUPERLIST (Optional):357155}  {additonal problems for provider to add (Optional):404173}  Advance Care Planning  Patient does not have a Health Care Directive: {ADVANCE_DIRECTIVE_STATUS:869774}       No data to display                   No data to display                   No data to display                      No data to display                    Today's PHQ-2 Score:       4/7/2025     2:54 PM   PHQ-2 ( 1999 Pfizer)   PHQ-2 Score Incomplete            No data to display              Social History     Tobacco Use    Smoking status: Never    Smokeless tobacco: Never   Vaping Use    Vaping status: Never Used   Substance Use Topics    Alcohol use: Yes    Drug use: No     {Provider  If there are gaps in the social history shown above, please follow the link to update and then refresh the note Link to Social and Substance History :305051}  ASCVD Risk   The 10-year ASCVD risk score (John RIOS, et al., 2019) is: 7.7%    Values used to calculate the score:      Age: 49 years      Sex: Male      Is Non- : No      Diabetic: Yes      Tobacco smoker: No      Systolic Blood Pressure: 165 mmHg      Is BP treated: No      HDL Cholesterol: 76 mg/dL      Total Cholesterol: 242 mg/dL         No data to display              {Provider  REQUIRED FOR AWV Use the storyboard to review patient history, after sections have been marked  "as reviewed, refresh note to capture documentation:999021}   Reviewed and updated as needed this visit by Provider                    {HISTORY OPTIONS (Optional):900729}    {ROS Picklists (Optional):866097}     Objective    Exam  BP (!) 165/90   Pulse 115   Temp 98.7  F (37.1  C) (Tympanic)   Resp 16   Ht 1.74 m (5' 8.5\")   Wt 73.9 kg (163 lb)   SpO2 96%   BMI 24.42 kg/m     Estimated body mass index is 24.42 kg/m  as calculated from the following:    Height as of this encounter: 1.74 m (5' 8.5\").    Weight as of this encounter: 73.9 kg (163 lb).    Physical Exam  {Exam Choices (Optional):145482}        Signed Electronically by: Nadeem Nino MD  {Email feedback regarding this note to [email or web address]   :252911}  "

## 2025-04-07 NOTE — PROGRESS NOTES
"SUBJECTIVE:  Codey Simon, a 49 year old male scheduled an appointment to discuss the following issues:  Follow-up dm1, foot fungus and gerd.   No chest pain. Gerd stable. No shortness of breath.  No sugars <40 or LOSS OF CONSCIENCE.   No smart phone - not intersted in continuous monitoring.   No hematuria or dysuria.  Active at work. Home ok. Sleep overall ok.   No feet changes or numbness. Due for eye exam - cheaters.   No nausea, vomiting or diarrhea or black/bloody stools.   Not interested colonoscopy. No family history colon cancer.  Yearly exam in March.   Medical, social, surgical, and family histories reviewed.    ROS:      OBJECTIVE:  /87   Pulse 115   Temp 98.7  F (37.1  C) (Tympanic)   Resp 16   Ht 1.74 m (5' 8.5\")   Wt 73.9 kg (163 lb)   SpO2 96%   BMI 24.42 kg/m      EXAM:  GENERAL APPEARANCE: healthy, alert and no distress  EYES: EOMI,  PERRL  HENT: ear canals and TM's normal and nose and mouth without ulcers or lesions  RESP: lungs clear to auscultation - no rales, rhonchi or wheezes  CV: regular rates and rhythm, normal S1 S2, no S3 or S4 and no murmur, click or rub -  ABDOMEN:  soft, nontender, no HSM or masses and bowel sounds normal  MS: extremities normal- no gross deformities noted, no evidence of inflammation in joints, FROM in all extremities.  NEURO: Normal strength and tone, sensory exam grossly normal, mentation intact and speech normal  PSYCH: mentation appears normal and affect normal/bright    ASSESSMENT / PLAN:  (E10.9) Type 1 diabetes mellitus without complication (H)  (primary encounter diagnosis)  Comment: stable  Plan: Lipid panel reflex to direct LDL Non-fasting,         HEMOGLOBIN A1C, BASIC METABOLIC PANEL, losartan        (COZAAR) 25 MG tablet, insulin glargine (LANTUS        VIAL) 100 UNIT/ML vial, insulin lispro         (HUMALOG) 100 UNIT/ML vial, blood glucose         monitoring (ACCU-CHEK FASTCLIX) lancets, blood         glucose (ACCU-CHEK GUIDE TEST) test " strip        Consider statin/continous monitoring. Reveiwed risks and side effects of medication  Continue diet/exercise. Follow-up eye exam  Patient not intersted in colon cancer screening or dm ed or more than 1x/year follow-up. .Call/email with questions/concerns      (B35.3) Tinea pedis of both feet  Comment: stable  Plan: ciclopirox (LOPROX) 0.77 % cream        Prn.   Nadeem Nino MD

## 2025-04-24 ENCOUNTER — TELEPHONE (OUTPATIENT)
Dept: FAMILY MEDICINE | Facility: CLINIC | Age: 50
End: 2025-04-24
Payer: COMMERCIAL

## 2025-04-24 DIAGNOSIS — E10.9 TYPE 1 DIABETES MELLITUS WITHOUT COMPLICATION (H): ICD-10-CM

## 2025-04-24 RX ORDER — INSULIN LISPRO 100 [IU]/ML
INJECTION, SOLUTION INTRAVENOUS; SUBCUTANEOUS
Qty: 60 ML | Refills: 11 | Status: SHIPPED | OUTPATIENT
Start: 2025-04-24

## 2025-04-24 NOTE — TELEPHONE ENCOUNTER
Patient states he was dispensed the generic form of Humalog instead of the brand manufactured by [employer]. Patient not sure how this happened as he has used the brand version for over a decade.     RN called pharmacy and staff stated that patient was dispensed the generic since the KEVAN (dispense as written) code was 0 which allows pharmacy to substitute with generic alternatives.    Pharmacy states a KEVAN code of 1 should be used if provider wants patient to only take the brand, [employer] manufactured version.    Called patient and attempted to relay information above x2. Unable to leave VM since patient reported number he was calling from is his girlfriend's. If patient calls back, let him know that Dr. Nino should order KEVAN as 1 from now on. Furthermore, since patient already picked up the generic Humalog, pharmacy will not allow him to return.       Will route to PCP as FYI for next prescription.       Kamla Lanier RN on 4/24/2025 at 12:54 PM